# Patient Record
Sex: MALE | Race: BLACK OR AFRICAN AMERICAN | NOT HISPANIC OR LATINO | Employment: OTHER | URBAN - NONMETROPOLITAN AREA
[De-identification: names, ages, dates, MRNs, and addresses within clinical notes are randomized per-mention and may not be internally consistent; named-entity substitution may affect disease eponyms.]

---

## 2019-11-14 ENCOUNTER — OFFICE VISIT (OUTPATIENT)
Dept: GASTROENTEROLOGY | Facility: CLINIC | Age: 64
End: 2019-11-14

## 2019-11-14 VITALS
SYSTOLIC BLOOD PRESSURE: 130 MMHG | BODY MASS INDEX: 45.31 KG/M2 | HEIGHT: 68 IN | HEART RATE: 66 BPM | OXYGEN SATURATION: 97 % | DIASTOLIC BLOOD PRESSURE: 80 MMHG | WEIGHT: 299 LBS

## 2019-11-14 DIAGNOSIS — Z78.9 NONSMOKER: ICD-10-CM

## 2019-11-14 DIAGNOSIS — E66.01 CLASS 3 SEVERE OBESITY DUE TO EXCESS CALORIES WITHOUT SERIOUS COMORBIDITY WITH BODY MASS INDEX (BMI) OF 45.0 TO 49.9 IN ADULT (HCC): ICD-10-CM

## 2019-11-14 DIAGNOSIS — D12.6 ADENOMATOUS POLYP OF COLON, UNSPECIFIED PART OF COLON: Primary | ICD-10-CM

## 2019-11-14 DIAGNOSIS — G47.30 SLEEP APNEA, UNSPECIFIED TYPE: ICD-10-CM

## 2019-11-14 PROBLEM — E66.813 CLASS 3 SEVERE OBESITY DUE TO EXCESS CALORIES WITHOUT SERIOUS COMORBIDITY WITH BODY MASS INDEX (BMI) OF 45.0 TO 49.9 IN ADULT: Status: ACTIVE | Noted: 2019-11-14

## 2019-11-14 PROCEDURE — S0260 H&P FOR SURGERY: HCPCS | Performed by: NURSE PRACTITIONER

## 2019-11-14 RX ORDER — ALLOPURINOL 100 MG/1
TABLET ORAL DAILY
COMMUNITY
Start: 2013-11-06

## 2019-11-14 RX ORDER — METOPROLOL SUCCINATE 100 MG/1
TABLET, EXTENDED RELEASE ORAL DAILY
COMMUNITY
Start: 2019-09-10

## 2019-11-14 RX ORDER — AMLODIPINE BESYLATE 10 MG/1
10 TABLET ORAL DAILY
Refills: 3 | COMMUNITY
Start: 2019-10-19

## 2019-11-14 RX ORDER — CHLORTHALIDONE 25 MG/1
TABLET ORAL DAILY
COMMUNITY

## 2019-11-14 RX ORDER — LOSARTAN POTASSIUM 25 MG/1
25 TABLET ORAL DAILY
COMMUNITY

## 2019-11-14 NOTE — PROGRESS NOTES
Devin Cadena  1955      11/14/2019  Chief Complaint   Patient presents with   • Colonoscopy     Subjective   HPI  Devin Cadena is a 64 y.o. male who presents as a referral for preventative maintenance. He has no complaints of nausea or vomiting. No change in bowels. No wt loss. No BRBPR. No melena. There is no family hx for colon cancer. No abdominal pain.  Past Medical History:   Diagnosis Date   • Hx of colonic polyp    • Hypertension      Past Surgical History:   Procedure Laterality Date   • COLONOSCOPY W/ POLYPECTOMY  11/11/2011    2 early tubular adenomas repeat exam in 5 years   • COLONOSCOPY W/ POLYPECTOMY  12/19/2016    4 Tubular adenomas cecum and at 50 cm repeat exam in 3 years     Outpatient Medications Marked as Taking for the 11/14/19 encounter (Office Visit) with Mikaela Espana APRN   Medication Sig Dispense Refill   • allopurinol (ZYLOPRIM) 100 MG tablet Daily.     • amLODIPine (NORVASC) 10 MG tablet Take 10 mg by mouth Daily.  3   • chlorthalidone (HYGROTON) 25 MG tablet Daily.     • losartan (COZAAR) 25 MG tablet Take 25 mg by mouth Daily.     • metoprolol succinate XL (TOPROL-XL) 100 MG 24 hr tablet Daily.     • [DISCONTINUED] metoprolol tartrate (LOPRESSOR) 50 MG tablet Take 50 mg by mouth 2 (Two) Times a Day.       No Known Allergies  Social History     Socioeconomic History   • Marital status:      Spouse name: Not on file   • Number of children: Not on file   • Years of education: Not on file   • Highest education level: Not on file   Tobacco Use   • Smoking status: Never Smoker   • Smokeless tobacco: Never Used   Substance and Sexual Activity   • Alcohol use: No     Family History   Problem Relation Age of Onset   • Colon cancer Neg Hx    • Colon polyps Neg Hx      Health Maintenance   Topic Date Due   • ANNUAL PHYSICAL  03/16/1956   • TDAP/TD VACCINES (1 - Tdap) 03/16/1972   • ZOSTER VACCINE (1 of 2) 03/16/2003   • PNEUMOCOCCAL VACCINES (65+ LOW/MEDIUM RISK) (1 of 2 -  "PCV13) 03/16/2018   • INFLUENZA VACCINE  08/01/2019   • HEPATITIS C SCREENING  09/24/2019   • COLONOSCOPY  12/19/2026       REVIEW OF SYSTEMS  General: well appearing, no fever chills or sweats, no unexplained wt loss  HEENT: no acute visual or hearing disturbances  Cardiovascular: No chest pain or palpitations  Pulmonary: No shortness of breath, coughing, wheezing or hemoptysis  : No burning, urgency, hematuria, or dysuria  Musculoskeletal: No joint pain or stiffness  Peripheral: no edema  Skin: No lesions or rashes  Neuro: No dizziness, headaches, stroke, syncope  Endocrine: No hot or cold intolerances  Hematological: No blood dyscrasias    Objective   Vitals:    11/14/19 1012   BP: 130/80   Pulse: 66   SpO2: 97%   Weight: 136 kg (299 lb)   Height: 172.7 cm (68\")     Body mass index is 45.46 kg/m².  Patient's Body mass index is 45.46 kg/m². BMI is above normal parameters. Recommendations include: nutrition counseling.      PHYSICAL EXAM  General: age appropriate well nourished well appearing, no acute distress  Head: normocephalic and atraumatic  Global assessment-supple  Neck-No JVD noted, no lymphadenopathy  Pulmonary-clear to auscultation bilaterally, normal respiratory effort  Cardiovascular-normal rate and rhythm, normal heart sounds, S1 and S2 noted  Abdomen-soft, non tender, non distended, normal bowel sounds all 4 quadrants, no hepatosplenomegaly noted  Extremities-No clubbing cyanosis or edema  Neuro-Non focal, converses appropriately, awake, alert, oriented    Assessment/Plan     Devin was seen today for colonoscopy.    Diagnoses and all orders for this visit:    Adenomatous polyp of colon, unspecified part of colon  Comments:  last colonscopy 12/20/2016 tubular adenoma x 3, of cecum also tubular adenoma at 50cm, and chronic inflammation of rectum  Orders:  -     Case Request; Standing  -     Implement Anesthesia Orders Day of Procedure; Standing  -     Obtain Informed Consent; Standing  -     Verify " bowel prep was successful; Standing  -     Case Request  -     polyethylene glycol (GoLYTELY) 236 g solution; Take as directed per office    Nonsmoker    Class 3 severe obesity due to excess calories without serious comorbidity with body mass index (BMI) of 45.0 to 49.9 in adult (CMS/formerly [facility])    Sleep apnea, unspecified type  Comments:  CPAP at night        COLONOSCOPY WITH ANESTHESIA (N/A)  Body mass index is 45.46 kg/m².  There are no Patient Instructions on file for this visit.  Patient instructions on prep prior to procedure provided to the patient.    All risks, benefits, alternatives, and indications of colonoscopy procedure have been discussed with the patient. Risks to include perforation of the colon requiring possible surgery or colostomy, risk of bleeding from biopsies or removal of colon tissue, possibility of missing a colon polyp or cancer, or adverse drug reaction.  Benefits to include the diagnosis and management of disease of the colon and rectum. Alternatives to include barium enema, radiographic evaluation, lab testing or no intervention. Pt verbalizes understanding and agrees.     Mikaela Espana, APRN  2019  10:38 AM      IF YOU SMOKE OR USE TOBACCO PLEASE READ THE FOLLOWIN minutes reading provided    Why is smoking bad for me?  Smoking increases the risk of heart disease, lung disease, vascular disease, stroke, and cancer.     If you smoke, STOP!    If you would like more information on quitting smoking, please visit the [employer] website: [email or web address]   This link will provide additional resources including the QUIT line and the Beat the Pack support groups.     For more information:    Quit Now [place]  -QUIT-NOW  [email or web address]/en-[email or web address]    Obesity, Adult  Obesity is the condition of having too much total body fat. Being overweight or obese means that your weight is greater than what is considered healthy for  your body size. Obesity is determined by a measurement called BMI. BMI is an estimate of body fat and is calculated from height and weight. For adults, a BMI of 30 or higher is considered obese.  Obesity can eventually lead to other health concerns and major illnesses, including:  · Stroke.  · Coronary artery disease (CAD).  · Type 2 diabetes.  · Some types of cancer, including cancers of the colon, breast, uterus, and gallbladder.  · Osteoarthritis.  · High blood pressure (hypertension).  · High cholesterol.  · Sleep apnea.  · Gallbladder stones.  · Infertility problems.  What are the causes?  The main cause of obesity is taking in (consuming) more calories than your body uses for energy. Other factors that contribute to this condition may include:  · Being born with genes that make you more likely to become obese.  · Having a medical condition that causes obesity. These conditions include:  ¨ Hypothyroidism.  ¨ Polycystic ovarian syndrome (PCOS).  ¨ Binge-eating disorder.  ¨ Cushing syndrome.  · Taking certain medicines, such as steroids, antidepressants, and seizure medicines.  · Not being physically active (sedentary lifestyle).  · Living where there are limited places to exercise safely or buy healthy foods.  · Not getting enough sleep.  What increases the risk?  The following factors may increase your risk of this condition:  · Having a family history of obesity.  · Being a woman of -American descent.  · Being a man of  descent.  What are the signs or symptoms?  Having excessive body fat is the main symptom of this condition.  How is this diagnosed?  This condition may be diagnosed based on:  · Your symptoms.  · Your medical history.  · A physical exam. Your health care provider may measure:  ¨ Your BMI. If you are an adult with a BMI between 25 and less than 30, you are considered overweight. If you are an adult with a BMI of 30 or higher, you are considered obese.  ¨ The distances around your  hips and your waist (circumferences). These may be compared to each other to help diagnose your condition.  ¨ Your skinfold thickness. Your health care provider may gently pinch a fold of your skin and measure it.  How is this treated?  Treatment for this condition often includes changing your lifestyle. Treatment may include some or all of the following:  · Dietary changes. Work with your health care provider and a dietitian to set a weight-loss goal that is healthy and reasonable for you. Dietary changes may include eating:  ¨ Smaller portions. A portion size is the amount of a particular food that is healthy for you to eat at one time. This varies from person to person.  ¨ Low-calorie or low-fat options.  ¨ More whole grains, fruits, and vegetables.  · Regular physical activity. This may include aerobic activity (cardio) and strength training.  · Medicine to help you lose weight. Your health care provider may prescribe medicine if you are unable to lose 1 pound a week after 6 weeks of eating more healthily and doing more physical activity.  · Surgery. Surgical options may include gastric banding and gastric bypass. Surgery may be done if:  ¨ Other treatments have not helped to improve your condition.  ¨ You have a BMI of 40 or higher.  ¨ You have life-threatening health problems related to obesity.  Follow these instructions at home:     Eating and drinking     · Follow recommendations from your health care provider about what you eat and drink. Your health care provider may advise you to:  ¨ Limit fast foods, sweets, and processed snack foods.  ¨ Choose low-fat options, such as low-fat milk instead of whole milk.  ¨ Eat 5 or more servings of fruits or vegetables every day.  ¨ Eat at home more often. This gives you more control over what you eat.  ¨ Choose healthy foods when you eat out.  ¨ Learn what a healthy portion size is.  ¨ Keep low-fat snacks on hand.  ¨ Avoid sugary drinks, such as soda, fruit juice,  iced tea sweetened with sugar, and flavored milk.  ¨ Eat a healthy breakfast.  · Drink enough water to keep your urine clear or pale yellow.  · Do not go without eating for long periods of time (do not fast) or follow a fad diet. Fasting and fad diets can be unhealthy and even dangerous.  Physical Activity   · Exercise regularly, as told by your health care provider. Ask your health care provider what types of exercise are safe for you and how often you should exercise.  · Warm up and stretch before being active.  · Cool down and stretch after being active.  · Rest between periods of activity.  Lifestyle   · Limit the time that you spend in front of your TV, computer, or video game system.  · Find ways to reward yourself that do not involve food.  · Limit alcohol intake to no more than 1 drink a day for nonpregnant women and 2 drinks a day for men. One drink equals 12 oz of beer, 5 oz of wine, or 1½ oz of hard liquor.  General instructions   · Keep a weight loss journal to keep track of the food you eat and how much you exercise you get.  · Take over-the-counter and prescription medicines only as told by your health care provider.  · Take vitamins and supplements only as told by your health care provider.  · Consider joining a support group. Your health care provider may be able to recommend a support group.  · Keep all follow-up visits as told by your health care provider. This is important.  Contact a health care provider if:  · You are unable to meet your weight loss goal after 6 weeks of dietary and lifestyle changes.  This information is not intended to replace advice given to you by your health care provider. Make sure you discuss any questions you have with your health care provider.  Document Released: 01/25/2006 Document Revised: 05/22/2017 Document Reviewed: 10/05/2016  [employer] Interactive Patient Education © 2017 [employer]

## 2020-03-02 ENCOUNTER — OUTSIDE FACILITY SERVICE (OUTPATIENT)
Dept: GASTROENTEROLOGY | Facility: CLINIC | Age: 65
End: 2020-03-02

## 2020-03-02 ENCOUNTER — LAB REQUISITION (OUTPATIENT)
Dept: LAB | Facility: HOSPITAL | Age: 65
End: 2020-03-02

## 2020-03-02 DIAGNOSIS — Z00.00 ENCOUNTER FOR GENERAL ADULT MEDICAL EXAMINATION WITHOUT ABNORMAL FINDINGS: ICD-10-CM

## 2020-03-02 PROCEDURE — 88305 TISSUE EXAM BY PATHOLOGIST: CPT | Performed by: INTERNAL MEDICINE

## 2020-03-02 PROCEDURE — 45385 COLONOSCOPY W/LESION REMOVAL: CPT | Performed by: INTERNAL MEDICINE

## 2020-03-03 LAB
CYTO UR: NORMAL
LAB AP CASE REPORT: NORMAL
LAB AP CLINICAL INFORMATION: NORMAL
PATH REPORT.FINAL DX SPEC: NORMAL
PATH REPORT.GROSS SPEC: NORMAL

## 2024-02-13 ENCOUNTER — TELEPHONE (OUTPATIENT)
Dept: UROLOGY | Facility: CLINIC | Age: 69
End: 2024-02-13
Payer: MEDICARE

## 2024-02-19 NOTE — PROGRESS NOTES
Subjective    Mr. Cadena is 68 y.o. male    Chief Complaint: Hydrocele    History of Present Illness  Patient is a 68-year-old gentleman who presents as a new patient to [facility] urology.  He was referred by his PCP from [facility].  In [facility].  Patient has a hydrocele and was also diagnosed with urinary tract infection.  The urinary tract infection has resolved patient denies any voiding symptoms or complaints at this time he was treated with Levaquin.  He did have a CT urogram that showed right kidney stone mild thickening of the wall of the right renal pelvis mild right hydro suspicious for infection.  January 2024 he had a PSA of 6.8 however this most likely was occurring in the setting of infection.  His complaint is his swollen scrotum he states he has had since the 80s and he just got slightly worse over the years it is nonpainful but is now become cumbersome and uncomfortable he wants surgical intervention.  Fortunately they did not bring the disc of the ultrasound with them.    The following portions of the patient's history were reviewed and updated as appRopriate: allergies, current medications, past family history, past medical history, past social history, past surgical history and problem list.    Review of Systems   Genitourinary:  Positive for scrotal swelling.         Current Outpatient Medications:     allopurinol (ZYLOPRIM) 100 MG tablet, Daily., Disp: , Rfl:     amLODIPine (NORVASC) 10 MG tablet, Take 10 mg by mouth Daily., Disp: , Rfl: 3    chlorthalidone (HYGROTON) 25 MG tablet, Daily., Disp: , Rfl:     losartan (COZAAR) 25 MG tablet, Take 25 mg by mouth Daily., Disp: , Rfl:     metoprolol succinate XL (TOPROL-XL) 100 MG 24 hr tablet, Daily., Disp: , Rfl:     polyethylene glycol (GoLYTELY) 236 g solution, Take as directed per office, Disp: 4000 mL, Rfl: 0    Past Medical History:   Diagnosis Date    Hx of colonic polyp     Hypertension        Past Surgical  History:   Procedure Laterality Date    COLONOSCOPY W/ POLYPECTOMY  11/11/2011    2 early tubular adenomas repeat exam in 5 years    COLONOSCOPY W/ POLYPECTOMY  12/19/2016    4 Tubular adenomas cecum and at 50 cm repeat exam in 3 years       Social History     Socioeconomic History    Marital status:    Tobacco Use    Smoking status: Never    Smokeless tobacco: Never   Substance and Sexual Activity    Alcohol use: No       Family History   Problem Relation Age of Onset    Colon cancer Neg Hx     Colon polyps Neg Hx        Objective    There were no vitals taken for this visit.    Physical Exam  Vitals reviewed.   Constitutional:       General: He is not in acute distress.     Appearance: Normal appearance. He is not toxic-appearing.   HENT:      Head: Normocephalic and atraumatic.   Pulmonary:      Effort: Pulmonary effort is normal.   Genitourinary:     Penis: Uncircumcised.       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  Skin:     Coloration: Skin is not pale.   Neurological:      Mental Status: He is alert.   Psychiatric:         Mood and Affect: Mood normal.         Behavior: Behavior normal.             Assessment and Plan    Diagnoses and all orders for this visit:    1. Hydrocele, unspecified hydrocele type (Primary)  -     POC Urinalysis Dipstick, Multipro    2. Elevated prostate specific antigen (PSA)  -     POC Urinalysis Dipstick, Multipro    Patient with large bilateral hydrocele that he did not bring the disc with them so was not able to view images so I will need to see those before making any decision on surgical intervention.  Told him I would contact her discussed with Dr. Acosta once I have reviewed the ultrasound he did have a CT urogram that shows no inguinal hernia.  I discussed hydrocelectomy and we just I discussed the risk factors such as  postoperative infection, postoperative hematoma or bleeding.  Postoperative swelling and pain.  Also stated that there are certain percentage of hydroceles recur after surgery.  Aware of this and wants to proceed with scheduling also he is on Xarelto so would have to stop his Xarelto for 3 to 5 days.  Again once I have reviewed the ultrasound I will contact Dr. Acosta regarding surgery.

## 2024-02-19 NOTE — H&P (VIEW-ONLY)
Subjective    Mr. Cadena is 68 y.o. male    Chief Complaint: Hydrocele    History of Present Illness  Patient is a 68-year-old gentleman who presents as a new patient to [facility] urology.  He was referred by his PCP from [facility].  In [facility].  Patient has a hydrocele and was also diagnosed with urinary tract infection.  The urinary tract infection has resolved patient denies any voiding symptoms or complaints at this time he was treated with Levaquin.  He did have a CT urogram that showed right kidney stone mild thickening of the wall of the right renal pelvis mild right hydro suspicious for infection.  January 2024 he had a PSA of 6.8 however this most likely was occurring in the setting of infection.  His complaint is his swollen scrotum he states he has had since the 80s and he just got slightly worse over the years it is nonpainful but is now become cumbersome and uncomfortable he wants surgical intervention.  Fortunately they did not bring the disc of the ultrasound with them.    The following portions of the patient's history were reviewed and updated as appRopriate: allergies, current medications, past family history, past medical history, past social history, past surgical history and problem list.    Review of Systems   Genitourinary:  Positive for scrotal swelling.         Current Outpatient Medications:     allopurinol (ZYLOPRIM) 100 MG tablet, Daily., Disp: , Rfl:     amLODIPine (NORVASC) 10 MG tablet, Take 10 mg by mouth Daily., Disp: , Rfl: 3    chlorthalidone (HYGROTON) 25 MG tablet, Daily., Disp: , Rfl:     losartan (COZAAR) 25 MG tablet, Take 25 mg by mouth Daily., Disp: , Rfl:     metoprolol succinate XL (TOPROL-XL) 100 MG 24 hr tablet, Daily., Disp: , Rfl:     polyethylene glycol (GoLYTELY) 236 g solution, Take as directed per office, Disp: 4000 mL, Rfl: 0    Past Medical History:   Diagnosis Date    Hx of colonic polyp     Hypertension        Past Surgical  History:   Procedure Laterality Date    COLONOSCOPY W/ POLYPECTOMY  11/11/2011    2 early tubular adenomas repeat exam in 5 years    COLONOSCOPY W/ POLYPECTOMY  12/19/2016    4 Tubular adenomas cecum and at 50 cm repeat exam in 3 years       Social History     Socioeconomic History    Marital status:    Tobacco Use    Smoking status: Never    Smokeless tobacco: Never   Substance and Sexual Activity    Alcohol use: No       Family History   Problem Relation Age of Onset    Colon cancer Neg Hx     Colon polyps Neg Hx        Objective    There were no vitals taken for this visit.    Physical Exam  Vitals reviewed.   Constitutional:       General: He is not in acute distress.     Appearance: Normal appearance. He is not toxic-appearing.   HENT:      Head: Normocephalic and atraumatic.   Pulmonary:      Effort: Pulmonary effort is normal.   Genitourinary:     Penis: Uncircumcised.       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  Skin:     Coloration: Skin is not pale.   Neurological:      Mental Status: He is alert.   Psychiatric:         Mood and Affect: Mood normal.         Behavior: Behavior normal.             Assessment and Plan    Diagnoses and all orders for this visit:    1. Hydrocele, unspecified hydrocele type (Primary)  -     POC Urinalysis Dipstick, Multipro    2. Elevated prostate specific antigen (PSA)  -     POC Urinalysis Dipstick, Multipro    Patient with large bilateral hydrocele that he did not bring the disc with them so was not able to view images so I will need to see those before making any decision on surgical intervention.  Told him I would contact her discussed with Dr. Acosta once I have reviewed the ultrasound he did have a CT urogram that shows no inguinal hernia.  I discussed hydrocelectomy and we just I discussed the risk factors such as  postoperative infection, postoperative hematoma or bleeding.  Postoperative swelling and pain.  Also stated that there are certain percentage of hydroceles recur after surgery.  Aware of this and wants to proceed with scheduling also he is on Xarelto so would have to stop his Xarelto for 3 to 5 days.  Again once I have reviewed the ultrasound I will contact Dr. Acosta regarding surgery.

## 2024-03-01 ENCOUNTER — PATIENT ROUNDING (BHMG ONLY) (OUTPATIENT)
Dept: UROLOGY | Facility: CLINIC | Age: 69
End: 2024-03-01
Payer: MEDICARE

## 2024-03-01 ENCOUNTER — OFFICE VISIT (OUTPATIENT)
Dept: UROLOGY | Facility: CLINIC | Age: 69
End: 2024-03-01
Payer: MEDICARE

## 2024-03-01 VITALS — HEIGHT: 69 IN | WEIGHT: 280 LBS | BODY MASS INDEX: 41.47 KG/M2

## 2024-03-01 DIAGNOSIS — R97.20 ELEVATED PROSTATE SPECIFIC ANTIGEN (PSA): ICD-10-CM

## 2024-03-01 DIAGNOSIS — N43.3 HYDROCELE, UNSPECIFIED HYDROCELE TYPE: Primary | ICD-10-CM

## 2024-03-01 RX ORDER — UBIDECARENONE 100 MG
CAPSULE ORAL
COMMUNITY

## 2024-03-01 RX ORDER — FLUTICASONE PROPIONATE 0.05 %
CREAM (GRAM) TOPICAL
COMMUNITY

## 2024-03-01 RX ORDER — RIVAROXABAN 20 MG/1
20 TABLET, FILM COATED ORAL DAILY
COMMUNITY

## 2024-03-01 RX ORDER — CETIRIZINE HYDROCHLORIDE 10 MG/1
1 TABLET ORAL DAILY
COMMUNITY

## 2024-03-01 RX ORDER — ROSUVASTATIN CALCIUM 20 MG/1
1 TABLET, COATED ORAL DAILY
COMMUNITY

## 2024-03-01 RX ORDER — METFORMIN HYDROCHLORIDE 500 MG/1
1 TABLET, EXTENDED RELEASE ORAL DAILY
COMMUNITY
Start: 2024-02-07

## 2024-03-01 NOTE — PROGRESS NOTES
March 1, 2024    Hello, may I speak with Devin Cadena?    My name is Tiana    I am  with [facility]  [address], [address]  [place] [address] [phone].    Before we get started may I verify your date of birth? 1955    I am calling to officially welcome you to our practice and ask about your recent visit. Is this a good time to talk? yes    Tell me about your visit with us. What things went well?  It was a good visit.       We're always looking for ways to make our patients' experiences even better. Do you have recommendations on ways we may improve?  no    Overall were you satisfied with your first visit to our practice? yes       I appreciate you taking the time to speak with me today. Is there anything else I can do for you? no      Thank you, and have a great day.

## 2024-03-06 ENCOUNTER — TELEPHONE (OUTPATIENT)
Dept: UROLOGY | Facility: CLINIC | Age: 69
End: 2024-03-06
Payer: MEDICARE

## 2024-03-08 ENCOUNTER — TELEPHONE (OUTPATIENT)
Dept: UROLOGY | Facility: CLINIC | Age: 69
End: 2024-03-08
Payer: MEDICARE

## 2024-03-08 PROBLEM — N43.3 HYDROCELE: Status: ACTIVE | Noted: 2024-03-01

## 2024-03-08 NOTE — TELEPHONE ENCOUNTER
I called patient and spoke with him and let him know that I was able to review the scrotal ultrasound after he brought the disc into the clinic.  I also told him I discussed his case with Dr. Acosta who said to go ahead as planned with surgery if he wants to do and he does.  I did tell Mr. Cadena that he may be walking bowlegged for about 2 weeks after the surgery due to discomfort and swelling.  Also told him he would have a Penrose drain coming from the scrotum for approximately 3 to 5 days after surgery.  Also he needs to stop his Xarelto for 5 days prior and he can take baby aspirin instead.  I let him know that he would be contacted by Dr. Acosta's medical assistant to get the surgery scheduled.  Patient had no further questions at this time.

## 2024-03-08 NOTE — TELEPHONE ENCOUNTER
----- Message from LORENA Disla sent at 3/8/2024 12:42 PM CST -----  Regarding: RE: Hydroceles  See Dr. Howell's note below but I put in the case request for this patient I have already spoken to him on the phone earlier and explained that we would be scheduling him for hydrocelectomy.  I told him to hold his Xarelto for 5 days  ----- Message -----  From: Daniel Acosta MD  Sent: 3/7/2024  12:09 PM CST  To: LORENA Disla; Balbina Poole CMA  Subject: RE: Hydroceles                                   Yes, if he is wanting surgery, you can schedule him for bilateral hydrocelectomy for 3/25, 3/26, or 3/29 or anytime in April.    He needs to hold the Xarelto 5 days prior and take a a baby aspirin instead.      Please make sure he understands he will have bilateral scrotal penrose drains for 3-5 days after, and will be walking now-legged for at least 2 weeks.     Thank you  ----- Message -----  From: Jamey Theodore PA  Sent: 3/7/2024  11:03 AM CST  To: Daniel Acosta MD  Subject: Hydroceles                                       Patient I saw with bilateral hydrocele.  He had a CT scan done in January that revealed no inguinal hernias and ultrasound revealed bilateral hydroceles he had brought in the disc of the ultrasound which I reviewed yesterday and does verify bilateral hydroceles also physical exam when I saw him was consistent also with hydroceles bilaterally.  He is on Xarelto which I told him he need to stop 3 to 5 days prior however he is wanting to have surgical intervention I did discuss risks of the surgery including recurrence hematoma excetra.  Was just checking with you regarding scheduling or would you want to see the patient?

## 2024-03-18 ENCOUNTER — PRE-ADMISSION TESTING (OUTPATIENT)
Dept: PREADMISSION TESTING | Facility: HOSPITAL | Age: 69
End: 2024-03-18
Payer: MEDICARE

## 2024-03-18 LAB
ANION GAP SERPL CALCULATED.3IONS-SCNC: 11 MMOL/L (ref 5–15)
BUN SERPL-MCNC: 16 MG/DL (ref 8–23)
BUN/CREAT SERPL: 19 (ref 7–25)
CALCIUM SPEC-SCNC: 9.2 MG/DL (ref 8.6–10.5)
CHLORIDE SERPL-SCNC: 105 MMOL/L (ref 98–107)
CO2 SERPL-SCNC: 26 MMOL/L (ref 22–29)
CREAT SERPL-MCNC: 0.84 MG/DL (ref 0.76–1.27)
DEPRECATED RDW RBC AUTO: 47.9 FL (ref 37–54)
EGFRCR SERPLBLD CKD-EPI 2021: 93.2 ML/MIN/1.73
ERYTHROCYTE [DISTWIDTH] IN BLOOD BY AUTOMATED COUNT: 13.9 % (ref 12.3–15.4)
GLUCOSE SERPL-MCNC: 142 MG/DL (ref 65–99)
HCT VFR BLD AUTO: 43.7 % (ref 37.5–51)
HGB BLD-MCNC: 14.5 G/DL (ref 13–17.7)
MCH RBC QN AUTO: 31.3 PG (ref 26.6–33)
MCHC RBC AUTO-ENTMCNC: 33.2 G/DL (ref 31.5–35.7)
MCV RBC AUTO: 94.2 FL (ref 79–97)
PLATELET # BLD AUTO: 269 10*3/MM3 (ref 140–450)
PMV BLD AUTO: 10.2 FL (ref 6–12)
POTASSIUM SERPL-SCNC: 3.4 MMOL/L (ref 3.5–5.2)
RBC # BLD AUTO: 4.64 10*6/MM3 (ref 4.14–5.8)
SODIUM SERPL-SCNC: 142 MMOL/L (ref 136–145)
WBC NRBC COR # BLD AUTO: 8.75 10*3/MM3 (ref 3.4–10.8)

## 2024-03-18 PROCEDURE — 93005 ELECTROCARDIOGRAM TRACING: CPT

## 2024-03-18 PROCEDURE — 80048 BASIC METABOLIC PNL TOTAL CA: CPT

## 2024-03-18 PROCEDURE — 36415 COLL VENOUS BLD VENIPUNCTURE: CPT

## 2024-03-18 PROCEDURE — 85027 COMPLETE CBC AUTOMATED: CPT

## 2024-03-18 RX ORDER — MULTIPLE VITAMINS W/ MINERALS TAB 9MG-400MCG
1 TAB ORAL DAILY
COMMUNITY

## 2024-03-18 RX ORDER — SACCHAROMYCES BOULARDII 250 MG
250 CAPSULE ORAL DAILY
COMMUNITY

## 2024-03-18 NOTE — DISCHARGE INSTRUCTIONS
Preparing for Surgery  Follow these instructions before the procedure:  Several days or weeks before your procedure  Medication(s) you need to stop   _______ days/week prior to surgery STOP XARELTO as directed by MD      Ask your health care provider about:  Changing or stopping your regular medicines. This is especially important if you are taking diabetes medicines or blood thinners.  Taking medicines such as aspirin and non-steroidal anti-inflammatory drugs (NSAIDS) such as ibuprofen that can thin your blood. Do not take these medicines unless your health care provider tells you to take them.  Taking over-the-counter medicines, vitamins, herbs, and supplements.  Contact your surgeon if you:  Develop a fever of more than 100.4°F (38°C) or other feelings of illness during the 48 hours before your surgery.  Have symptoms that get worse.  Have questions or concerns about your surgery.  If you are going home the same day of your surgery you will need to arrange for a responsible adult, age 18 years old or older, to drive you home from the hospital and stay with you for 24 hours. Verification of the  will be made prior to any procedure requiring sedation. You may not go home in a taxi or any form of public transportation by yourself.     Day before your procedure  Medication(s) you need to stop the day before your surgery:  Losartan (COZAAR)    24 hours before your procedure DO NOT drink alcoholic beverages or smoke.  24 hours before your procedure STOP taking Erectile Dysfunction medication (i.e.,Cialis, Viagra)   You may be asked to shower with a germ-killing soap.  Day of your procedure   You may take the following medication(s) the morning of surgery with a sip of water:  METOPROLOL (Toprol XL)      8 hours before your procedure STOP all food, any dairy products, and full liquids. This includes hard candy, chewing gum or mints. This is extremely important to prevent serious complications.   Up to 2 hours  before your scheduled arrival time, you may have clear liquids no cream, powder, or pulp of any kind. Safe options are water, black coffee, plain tea, soda, Gatorade/Powerade, clear broth, apple juice.  2 hours before your scheduled arrival time, STOP drinking clear liquids.  You may need to take another shower with a germ-killing soap before you leave home in the morning. Do not use perfumes, colognes, or body lotions.  Wear comfortable loose-fitting clothing.  Remove all jewelry including body piercing and rings, dark colored nail polish, and make up prior to arrival at the hospital. Leave all valuables at home.   Bring your hearing aids if you rely on them.  Do not wear contact lenses. If you wear eyeglasses remember to bring a case to store them in while you are in surgery.  Do not use denture adhesives since you will be asked to remove them during your surgery.    You do not need to bring your home medications into the hospital.   Bring your sleep apnea device with you on the day of your surgery (if this applies to you).  If you wear portable oxygen, bring it with you.   If you are staying overnight, you may bring a bag of items you may need such as slippers, robe and a change of clothes for your discharge. You may want to leave these items in the car until you are ready for them since your family will take your belongings when you leave the pre-operative area.  Arrive at the hospital as scheduled by the office. You will be asked to arrive 2 hours prior to your surgery time in order to prepare for your procedure.  When you arrive at the hospital  Go to the registration desk located at the main entrance of the hospital.  After registration is completed, you will be given a beeper and a sticker sheet. Take the stickers to Outpatient Surgery and place in the tray at the end of the desk to notify the staff that you have arrived and registered.   Return to the lobby to wait. You are not always called back according  to the time of arrival but rather the time your doctor will be ready.  When your beeper lights up and vibrates proceed through the double doors, under the stairs, and a member of the Outpatient Surgery staff will escort you to your preoperative room.

## 2024-03-20 ENCOUNTER — TELEPHONE (OUTPATIENT)
Dept: UROLOGY | Facility: CLINIC | Age: 69
End: 2024-03-20
Payer: MEDICARE

## 2024-03-20 LAB
QT INTERVAL: 464 MS
QTC INTERVAL: 470 MS

## 2024-03-20 NOTE — TELEPHONE ENCOUNTER
Called patient to remind them to arrive at patient registration on 3-25-24 at 11am for the procedure with Dr. Acosta. Spoke with patient. Told patient if they had any questions to please contact our office at [phone].

## 2024-03-25 ENCOUNTER — ANESTHESIA EVENT (OUTPATIENT)
Dept: PERIOP | Facility: HOSPITAL | Age: 69
End: 2024-03-25
Payer: MEDICARE

## 2024-03-25 ENCOUNTER — HOSPITAL ENCOUNTER (OUTPATIENT)
Facility: HOSPITAL | Age: 69
Setting detail: HOSPITAL OUTPATIENT SURGERY
Discharge: HOME | End: 2024-03-25
Attending: UROLOGY | Admitting: UROLOGY
Payer: MEDICARE

## 2024-03-25 ENCOUNTER — ANESTHESIA (OUTPATIENT)
Dept: PERIOP | Facility: HOSPITAL | Age: 69
End: 2024-03-25
Payer: MEDICARE

## 2024-03-25 VITALS
TEMPERATURE: 97.5 F | HEART RATE: 58 BPM | RESPIRATION RATE: 16 BRPM | SYSTOLIC BLOOD PRESSURE: 164 MMHG | DIASTOLIC BLOOD PRESSURE: 85 MMHG | WEIGHT: 284.39 LBS | BODY MASS INDEX: 43.1 KG/M2 | OXYGEN SATURATION: 93 % | HEIGHT: 68 IN

## 2024-03-25 DIAGNOSIS — N43.3 HYDROCELE, UNSPECIFIED HYDROCELE TYPE: ICD-10-CM

## 2024-03-25 DIAGNOSIS — N43.2 OTHER HYDROCELE: Primary | ICD-10-CM

## 2024-03-25 LAB
GLUCOSE BLDC GLUCOMTR-MCNC: 127 MG/DL (ref 70–130)
GLUCOSE BLDC GLUCOMTR-MCNC: 136 MG/DL (ref 70–130)

## 2024-03-25 PROCEDURE — 55040 REMOVAL OF HYDROCELE: CPT | Performed by: UROLOGY

## 2024-03-25 PROCEDURE — 25010000002 CLINDAMYCIN 900 MG/50ML SOLUTION: Performed by: PHYSICIAN ASSISTANT

## 2024-03-25 PROCEDURE — 25010000002 ONDANSETRON PER 1 MG

## 2024-03-25 PROCEDURE — 54840 REMOVE EPIDIDYMIS LESION: CPT | Performed by: UROLOGY

## 2024-03-25 PROCEDURE — 88302 TISSUE EXAM BY PATHOLOGIST: CPT | Performed by: UROLOGY

## 2024-03-25 PROCEDURE — 25010000002 DEXAMETHASONE PER 1 MG

## 2024-03-25 PROCEDURE — 25010000002 PROPOFOL 10 MG/ML EMULSION

## 2024-03-25 PROCEDURE — 25010000002 SUGAMMADEX 200 MG/2ML SOLUTION

## 2024-03-25 PROCEDURE — 25810000003 LACTATED RINGERS PER 1000 ML: Performed by: UROLOGY

## 2024-03-25 PROCEDURE — 25010000002 BUPIVACAINE 0.5 % SOLUTION: Performed by: UROLOGY

## 2024-03-25 PROCEDURE — 25810000003 SODIUM CHLORIDE PER 500 ML: Performed by: UROLOGY

## 2024-03-25 PROCEDURE — 25010000002 FENTANYL CITRATE (PF) 100 MCG/2ML SOLUTION

## 2024-03-25 PROCEDURE — 82948 REAGENT STRIP/BLOOD GLUCOSE: CPT

## 2024-03-25 RX ORDER — SODIUM CHLORIDE 0.9 % (FLUSH) 0.9 %
3 SYRINGE (ML) INJECTION EVERY 12 HOURS SCHEDULED
Status: DISCONTINUED | OUTPATIENT
Start: 2024-03-25 | End: 2024-03-25 | Stop reason: HOSPADM

## 2024-03-25 RX ORDER — CLINDAMYCIN PHOSPHATE 900 MG/50ML
900 INJECTION, SOLUTION INTRAVENOUS ONCE
Status: COMPLETED | OUTPATIENT
Start: 2024-03-25 | End: 2024-03-25

## 2024-03-25 RX ORDER — BACITRACIN ZINC 500 [USP'U]/G
OINTMENT TOPICAL AS NEEDED
Status: DISCONTINUED | OUTPATIENT
Start: 2024-03-25 | End: 2024-03-25 | Stop reason: HOSPADM

## 2024-03-25 RX ORDER — LIDOCAINE HYDROCHLORIDE 20 MG/ML
INJECTION, SOLUTION EPIDURAL; INFILTRATION; INTRACAUDAL; PERINEURAL AS NEEDED
Status: DISCONTINUED | OUTPATIENT
Start: 2024-03-25 | End: 2024-03-25 | Stop reason: SURG

## 2024-03-25 RX ORDER — SODIUM CHLORIDE 9 MG/ML
INJECTION, SOLUTION INTRAVENOUS AS NEEDED
Status: DISCONTINUED | OUTPATIENT
Start: 2024-03-25 | End: 2024-03-25 | Stop reason: HOSPADM

## 2024-03-25 RX ORDER — SODIUM CHLORIDE 9 MG/ML
40 INJECTION, SOLUTION INTRAVENOUS AS NEEDED
Status: DISCONTINUED | OUTPATIENT
Start: 2024-03-25 | End: 2024-03-25 | Stop reason: HOSPADM

## 2024-03-25 RX ORDER — HYDROCODONE BITARTRATE AND ACETAMINOPHEN 5; 325 MG/1; MG/1
1 TABLET ORAL EVERY 4 HOURS PRN
Status: DISCONTINUED | OUTPATIENT
Start: 2024-03-25 | End: 2024-03-25 | Stop reason: HOSPADM

## 2024-03-25 RX ORDER — DROPERIDOL 2.5 MG/ML
0.62 INJECTION, SOLUTION INTRAMUSCULAR; INTRAVENOUS ONCE AS NEEDED
Status: DISCONTINUED | OUTPATIENT
Start: 2024-03-25 | End: 2024-03-25 | Stop reason: HOSPADM

## 2024-03-25 RX ORDER — BUPIVACAINE HYDROCHLORIDE 5 MG/ML
INJECTION, SOLUTION PERINEURAL AS NEEDED
Status: DISCONTINUED | OUTPATIENT
Start: 2024-03-25 | End: 2024-03-25 | Stop reason: HOSPADM

## 2024-03-25 RX ORDER — FENTANYL CITRATE 50 UG/ML
INJECTION, SOLUTION INTRAMUSCULAR; INTRAVENOUS AS NEEDED
Status: DISCONTINUED | OUTPATIENT
Start: 2024-03-25 | End: 2024-03-25 | Stop reason: SURG

## 2024-03-25 RX ORDER — PROPOFOL 10 MG/ML
VIAL (ML) INTRAVENOUS AS NEEDED
Status: DISCONTINUED | OUTPATIENT
Start: 2024-03-25 | End: 2024-03-25 | Stop reason: SURG

## 2024-03-25 RX ORDER — ONDANSETRON 4 MG/1
4 TABLET, ORALLY DISINTEGRATING ORAL EVERY 6 HOURS PRN
Qty: 6 TABLET | Refills: 1 | Status: SHIPPED | OUTPATIENT
Start: 2024-03-25

## 2024-03-25 RX ORDER — FLUMAZENIL 0.1 MG/ML
0.2 INJECTION INTRAVENOUS AS NEEDED
Status: DISCONTINUED | OUTPATIENT
Start: 2024-03-25 | End: 2024-03-25 | Stop reason: HOSPADM

## 2024-03-25 RX ORDER — DEXAMETHASONE SODIUM PHOSPHATE 4 MG/ML
INJECTION, SOLUTION INTRA-ARTICULAR; INTRALESIONAL; INTRAMUSCULAR; INTRAVENOUS; SOFT TISSUE AS NEEDED
Status: DISCONTINUED | OUTPATIENT
Start: 2024-03-25 | End: 2024-03-25 | Stop reason: SURG

## 2024-03-25 RX ORDER — SODIUM CHLORIDE 0.9 % (FLUSH) 0.9 %
3 SYRINGE (ML) INJECTION AS NEEDED
Status: DISCONTINUED | OUTPATIENT
Start: 2024-03-25 | End: 2024-03-25 | Stop reason: HOSPADM

## 2024-03-25 RX ORDER — NALOXONE HCL 0.4 MG/ML
0.4 VIAL (ML) INJECTION AS NEEDED
Status: DISCONTINUED | OUTPATIENT
Start: 2024-03-25 | End: 2024-03-25 | Stop reason: HOSPADM

## 2024-03-25 RX ORDER — HYDROCODONE BITARTRATE AND ACETAMINOPHEN 10; 325 MG/1; MG/1
1 TABLET ORAL EVERY 4 HOURS PRN
Status: DISCONTINUED | OUTPATIENT
Start: 2024-03-25 | End: 2024-03-25 | Stop reason: HOSPADM

## 2024-03-25 RX ORDER — ONDANSETRON 2 MG/ML
INJECTION INTRAMUSCULAR; INTRAVENOUS AS NEEDED
Status: DISCONTINUED | OUTPATIENT
Start: 2024-03-25 | End: 2024-03-25 | Stop reason: SURG

## 2024-03-25 RX ORDER — ROCURONIUM BROMIDE 10 MG/ML
INJECTION, SOLUTION INTRAVENOUS AS NEEDED
Status: DISCONTINUED | OUTPATIENT
Start: 2024-03-25 | End: 2024-03-25 | Stop reason: SURG

## 2024-03-25 RX ORDER — LIDOCAINE HYDROCHLORIDE 10 MG/ML
0.5 INJECTION, SOLUTION EPIDURAL; INFILTRATION; INTRACAUDAL; PERINEURAL ONCE AS NEEDED
Status: DISCONTINUED | OUTPATIENT
Start: 2024-03-25 | End: 2024-03-25 | Stop reason: HOSPADM

## 2024-03-25 RX ORDER — SULFAMETHOXAZOLE AND TRIMETHOPRIM 800; 160 MG/1; MG/1
1 TABLET ORAL 2 TIMES DAILY
Qty: 14 TABLET | Refills: 0 | Status: SHIPPED | OUTPATIENT
Start: 2024-03-25 | End: 2024-04-01

## 2024-03-25 RX ORDER — SODIUM CHLORIDE, SODIUM LACTATE, POTASSIUM CHLORIDE, CALCIUM CHLORIDE 600; 310; 30; 20 MG/100ML; MG/100ML; MG/100ML; MG/100ML
1000 INJECTION, SOLUTION INTRAVENOUS CONTINUOUS
Status: DISCONTINUED | OUTPATIENT
Start: 2024-03-25 | End: 2024-03-25 | Stop reason: HOSPADM

## 2024-03-25 RX ORDER — ONDANSETRON 2 MG/ML
4 INJECTION INTRAMUSCULAR; INTRAVENOUS ONCE AS NEEDED
Status: DISCONTINUED | OUTPATIENT
Start: 2024-03-25 | End: 2024-03-25 | Stop reason: HOSPADM

## 2024-03-25 RX ORDER — DOCUSATE SODIUM 100 MG/1
100 CAPSULE, LIQUID FILLED ORAL 2 TIMES DAILY
Qty: 60 CAPSULE | Refills: 1 | Status: SHIPPED | OUTPATIENT
Start: 2024-03-25

## 2024-03-25 RX ORDER — LABETALOL HYDROCHLORIDE 5 MG/ML
5 INJECTION, SOLUTION INTRAVENOUS
Status: DISCONTINUED | OUTPATIENT
Start: 2024-03-25 | End: 2024-03-25 | Stop reason: HOSPADM

## 2024-03-25 RX ORDER — IBUPROFEN 600 MG/1
600 TABLET ORAL EVERY 6 HOURS PRN
Status: DISCONTINUED | OUTPATIENT
Start: 2024-03-25 | End: 2024-03-25 | Stop reason: HOSPADM

## 2024-03-25 RX ORDER — NAPROXEN 250 MG/1
250 TABLET ORAL 2 TIMES DAILY WITH MEALS
Qty: 60 TABLET | Refills: 0 | Status: SHIPPED | OUTPATIENT
Start: 2024-03-25

## 2024-03-25 RX ORDER — HYDROCODONE BITARTRATE AND ACETAMINOPHEN 7.5; 325 MG/1; MG/1
1 TABLET ORAL EVERY 6 HOURS PRN
Qty: 18 TABLET | Refills: 0 | Status: SHIPPED | OUTPATIENT
Start: 2024-03-25 | End: 2024-03-28

## 2024-03-25 RX ORDER — FENTANYL CITRATE 50 UG/ML
50 INJECTION, SOLUTION INTRAMUSCULAR; INTRAVENOUS
Status: DISCONTINUED | OUTPATIENT
Start: 2024-03-25 | End: 2024-03-25 | Stop reason: HOSPADM

## 2024-03-25 RX ORDER — SODIUM CHLORIDE 0.9 % (FLUSH) 0.9 %
3-10 SYRINGE (ML) INJECTION AS NEEDED
Status: DISCONTINUED | OUTPATIENT
Start: 2024-03-25 | End: 2024-03-25 | Stop reason: HOSPADM

## 2024-03-25 RX ORDER — SODIUM CHLORIDE, SODIUM LACTATE, POTASSIUM CHLORIDE, CALCIUM CHLORIDE 600; 310; 30; 20 MG/100ML; MG/100ML; MG/100ML; MG/100ML
100 INJECTION, SOLUTION INTRAVENOUS CONTINUOUS
Status: DISCONTINUED | OUTPATIENT
Start: 2024-03-25 | End: 2024-03-25 | Stop reason: HOSPADM

## 2024-03-25 RX ADMIN — FENTANYL CITRATE 100 MCG: 50 INJECTION, SOLUTION INTRAMUSCULAR; INTRAVENOUS at 12:46

## 2024-03-25 RX ADMIN — ROCURONIUM BROMIDE 30 MG: 50 INJECTION INTRAVENOUS at 12:48

## 2024-03-25 RX ADMIN — PROPOFOL 150 MG: 10 INJECTION, EMULSION INTRAVENOUS at 12:48

## 2024-03-25 RX ADMIN — DEXAMETHASONE SODIUM PHOSPHATE 4 MG: 4 INJECTION, SOLUTION INTRA-ARTICULAR; INTRALESIONAL; INTRAMUSCULAR; INTRAVENOUS; SOFT TISSUE at 12:56

## 2024-03-25 RX ADMIN — ONDANSETRON 4 MG: 2 INJECTION INTRAMUSCULAR; INTRAVENOUS at 12:56

## 2024-03-25 RX ADMIN — SUGAMMADEX 200 MG: 100 INJECTION, SOLUTION INTRAVENOUS at 14:04

## 2024-03-25 RX ADMIN — SODIUM CHLORIDE, POTASSIUM CHLORIDE, SODIUM LACTATE AND CALCIUM CHLORIDE 1000 ML: 600; 310; 30; 20 INJECTION, SOLUTION INTRAVENOUS at 11:54

## 2024-03-25 RX ADMIN — LIDOCAINE HYDROCHLORIDE 80 MG: 20 INJECTION, SOLUTION EPIDURAL; INFILTRATION; INTRACAUDAL; PERINEURAL at 12:48

## 2024-03-25 RX ADMIN — CLINDAMYCIN PHOSPHATE 900 MG: 900 INJECTION, SOLUTION INTRAVENOUS at 12:53

## 2024-03-25 NOTE — INTERVAL H&P NOTE
H&P updated. The patient was examined and the following changes are noted:    Patient has bilateral bothersome left greater than right hydroceles for which he requests repair.  We discussed options for observation, needle aspiration, staged repair.  He would like to proceed with concomitant bilateral hydrocele repair today.  We discussed risks of the procedure including but not limited to infection, bleeding, need for additional procedures, need for postoperative scrotal Penrose drain placement, postoperative hematoma, chronic pain, recurrence, complications of anesthesia.  He has been holding his anticoagulation for last 5 days and wishes to proceed with bilateral hydrocele repair today.

## 2024-03-25 NOTE — PROGRESS NOTES
Subjective    Mr. Cadena is 69 y.o. male    Chief Complaint: Postoperative visit    History of Present Illness  Patient is a 69-year-old gentleman that initially saw me for scrotal swelling he underwent left hydrocelectomy and right spermatocelectomy 03/25/2024 with Dr. Acosta.  Surgery was uneventful and went as planned patient states she has had hardly any pain or no pain at all since the surgery.  As expected postoperative swelling both Penrose drains are secured in place no obvious drainage from the Penrose drains.  No evidence of tissue infection involving the scrotum.    The following portions of the patient's history were reviewed and updated as appropriate: allergies, current medications, past family history, past medical history, past social history, past surgical history and problem list.    Review of Systems   Constitutional:  Negative for chills and fever.   Gastrointestinal:  Negative for abdominal pain, anal bleeding and blood in stool.   Genitourinary:  Positive for scrotal swelling. Negative for dysuria and hematuria.         Current Outpatient Medications:     allopurinol (ZYLOPRIM) 100 MG tablet, Daily., Disp: , Rfl:     amLODIPine (NORVASC) 10 MG tablet, Take 1 tablet by mouth Daily., Disp: , Rfl: 3    atorvastatin (LIPITOR) 10 MG tablet, Take 1 tablet by mouth Daily., Disp: , Rfl:     B Complex Vitamins (VITAMIN B COMPLEX PO), Take 1 tablet by mouth Daily., Disp: , Rfl:     Berberine Chloride (BERBERINE HCI PO), Take 1 tablet by mouth Daily., Disp: , Rfl:     cetirizine (zyrTEC) 10 MG tablet, Take 1 tablet by mouth Daily As Needed for Allergies or Rhinitis., Disp: , Rfl:     coenzyme Q10 100 MG capsule, Take 1 capsule by mouth Daily., Disp: , Rfl:     docusate sodium (Colace) 100 MG capsule, Take 1 capsule by mouth 2 (Two) Times a Day., Disp: 60 capsule, Rfl: 1    losartan (COZAAR) 25 MG tablet, Take 4 tablets by mouth Daily., Disp: , Rfl:     LYCOPENE PO, Take 1 tablet by mouth Daily., Disp: ,  Rfl:     metFORMIN ER (GLUCOPHAGE-XR) 500 MG 24 hr tablet, Take 1 tablet by mouth Daily., Disp: , Rfl:     metoprolol succinate XL (TOPROL-XL) 100 MG 24 hr tablet, Daily., Disp: , Rfl:     multivitamin with minerals tablet tablet, Take 1 tablet by mouth Daily., Disp: , Rfl:     naproxen (NAPROSYN) 250 MG tablet, Take 1 tablet by mouth 2 (Two) Times a Day With Meals., Disp: 60 tablet, Rfl: 0    NON FORMULARY, Take 1 tablet by mouth Daily. RESBERATROL, Disp: , Rfl:     NON FORMULARY, Take 1 tablet by mouth Daily. curcurmin, Disp: , Rfl:     OIL OF OREGANO PO, Take 100 mg by mouth 2 (Two) Times a Day., Disp: , Rfl:     ondansetron ODT (ZOFRAN-ODT) 4 MG disintegrating tablet, Place 1 tablet on the tongue Every 6 (Six) Hours As Needed for Nausea., Disp: 6 tablet, Rfl: 1    saccharomyces boulardii (FLORASTOR) 250 MG capsule, Take 1 capsule by mouth Daily., Disp: , Rfl:     sulfamethoxazole-trimethoprim (BACTRIM DS,SEPTRA DS) 800-160 MG per tablet, Take 1 tablet by mouth 2 (Two) Times a Day for 7 days., Disp: 14 tablet, Rfl: 0    vitamin D3 (vitamin d) 125 MCG (5000 UT) capsule capsule, Take 1 capsule by mouth Daily., Disp: , Rfl:     Zinc Sulfate (ZINC 15 PO), Take 1 tablet by mouth Daily., Disp: , Rfl:     Past Medical History:   Diagnosis Date    Arthritis     Diabetes mellitus     Hx of colonic polyp     Hyperlipidemia     Hypertension     Sleep apnea     Stroke     TIA (transient ischemic attack)        Past Surgical History:   Procedure Laterality Date    COLONOSCOPY W/ POLYPECTOMY  11/11/2011    2 early tubular adenomas repeat exam in 5 years    COLONOSCOPY W/ POLYPECTOMY  12/19/2016    4 Tubular adenomas cecum and at 50 cm repeat exam in 3 years    HYDROCELECTOMY Bilateral 3/25/2024    Procedure: HYDROCELECTOMY;  Surgeon: Daniel Acosta MD;  Location: [facility];  Service: Urology;  Laterality: Bilateral;    URETERAL STENT INSERTION         Social History     Socioeconomic History    Marital status:     Tobacco Use    Smoking status: Never    Smokeless tobacco: Never   Vaping Use    Vaping status: Never Used   Substance and Sexual Activity    Alcohol use: No    Drug use: Defer    Sexual activity: Defer       Family History   Problem Relation Age of Onset    Colon cancer Neg Hx     Colon polyps Neg Hx        Objective    There were no vitals taken for this visit.    Physical Exam  Constitutional:       Appearance: Normal appearance.   HENT:      Head: Normocephalic and atraumatic.   Pulmonary:      Effort: Pulmonary effort is normal.   Genitourinary:     Comments: Scrotal swelling incision in the midline to scrotum appears well-healed no pus or drainage.  2 Penrose drains intact and in place with no drainage from either.  Neurological:      Mental Status: He is alert.             Results for orders placed or performed in visit on 03/28/24   POC Urinalysis Dipstick, Multipro    Specimen: Urine   Result Value Ref Range    Color Yellow Yellow, Straw, Dark Yellow, Anabel    Clarity, UA Clear Clear    Glucose, UA Negative Negative mg/dL    Bilirubin Negative Negative    Ketones, UA Negative Negative    Specific Gravity  1.015 1.005 - 1.030    Blood, UA Trace (A) Negative    pH, Urine 6.5 5.0 - 8.0    Protein, POC Negative Negative mg/dL    Urobilinogen, UA 0.2 E.U./dL Normal, 0.2 E.U./dL    Nitrite, UA Negative Negative    Leukocytes Small (1+) (A) Negative     Assessment and Plan    Diagnoses and all orders for this visit:    1. Postoperative visit (Primary)  -     POC Urinalysis Dipstick, Multipro    2. Hydrocele, unspecified hydrocele type    3. Spermatocele    I removed both Penrose drains without difficulty there is no active or obvious drainage from either.  Patient tolerated examination well states he had no pain since surgery no evidence of infection on exam.  Incision appears well-healed.  I had sent message to Dr. Acosta and patient can resume his Xarelto.  Continue light activity until seen by   Dave.    Follow-up as scheduled with Dr. Acosta.

## 2024-03-25 NOTE — ANESTHESIA POSTPROCEDURE EVALUATION
"Patient: Devin Cadena    Procedure Summary       Date: 03/25/24 Room / Location:  [facility] OR 09 /  [facility]    Anesthesia Start: 1245 Anesthesia Stop: 1414    Procedure: HYDROCELECTOMY (Bilateral: Scrotum) Diagnosis:       Hydrocele, unspecified hydrocele type      (Hydrocele, unspecified hydrocele type [N43.3])    Surgeons: Daniel Acosta MD Provider: Madhuri Ruiz CRNA    Anesthesia Type: general ASA Status: 3            Anesthesia Type: general    Vitals  Vitals Value Taken Time   /77 03/25/24 1506   Temp 97.5 °F (36.4 °C) 03/25/24 1506   Pulse 62 03/25/24 1509   Resp 11 03/25/24 1506   SpO2 95 % 03/25/24 1509   Vitals shown include unfiled device data.        Post Anesthesia Care and Evaluation    Patient location during evaluation: PACU  Patient participation: complete - patient participated  Level of consciousness: awake and alert  Pain management: adequate    Airway patency: patent  Anesthetic complications: No anesthetic complications  PONV Status: none  Cardiovascular status: acceptable and hemodynamically stable  Respiratory status: acceptable  Hydration status: acceptable    Comments: Blood pressure 164/85, pulse 58, temperature 97.5 °F (36.4 °C), resp. rate 16, height 173 cm (68.11\"), weight 129 kg (284 lb 6.3 oz), SpO2 93%.    Patient discharged from PACU based upon Alexander score. Please see RN notes for further details    "

## 2024-03-25 NOTE — BRIEF OP NOTE
HYDROCELECTOMY  Progress Note    Devin Cadena  3/25/2024    Pre-op Diagnosis:   Hydrocele, unspecified hydrocele type [N43.3]       Post-Op Diagnosis Codes:     * Hydrocele, unspecified hydrocele type [N43.3]    Procedure/CPT® Codes:        Procedure(s):  LEFT HYDROCELECTOMY,   RIGHT SPERMATOCELECTOMY              Surgeon(s):  Daniel Acosta MD    Anesthesia: General    Staff:   Circulator: Debra Dias RN; Annia Escudero RN  Scrub Person: Natividad Enriquez; June Tamayo         Estimated Blood Loss: minimal    Urine Voided: * No values recorded between 3/25/2024 12:45 PM and 3/25/2024  2:12 PM *    Specimens:                Specimens       ID Source Type Tests Collected By Collected At [facility]?    A Scrotum Skin TISSUE PATHOLOGY EXAM   Daniel Acosta MD 3/25/24 1320     Description: LEFT HYDROCELE SAC    B Scrotum Skin TISSUE PATHOLOGY EXAM   Daniel Acosta MD 3/25/24 1320     Description: RIGHT HYDROCELE SAC                  Drains: Bilateral scrotal penrose drains   Open Drain Groin (Active)   Site Description Unable to view 03/25/24 1412       Findings: Moderate left hydrocele.    Large right spermatocele mimicking hydrocele with severe desmoplastic reaction densely adherent to right scrotal wall and testicle, cyst wall was removed;        Complications: None        Daniel Acosta MD     Date: 3/25/2024  Time: 14:29 CDT

## 2024-03-25 NOTE — ANESTHESIA PROCEDURE NOTES
Airway  Date/Time: 3/25/2024 12:50 PM  Airway not difficult    General Information and Staff    Patient location during procedure: OR  CRNA/CAA: Madhuri Ruiz CRNA    Indications and Patient Condition  Indications for airway management: airway protection    Preoxygenated: yes  Mask difficulty assessment: 2 - vent by mask + OA or adjuvant +/- NMBA    Final Airway Details  Final airway type: endotracheal airway      Successful airway: ETT  Cuffed: yes   Successful intubation technique: video laryngoscopy  Facilitating devices/methods: intubating stylet and Bougie  Endotracheal tube insertion site: oral  Blade: Singh  Blade size: 4  ETT size (mm): 7.5  Cormack-Lehane Classification: grade IIa - partial view of glottis  Placement verified by: chest auscultation and capnometry   Cuff volume (mL): 7  Measured from: teeth  ETT/EBT  to teeth (cm): 21  Number of attempts at approach: 2  Assessment: lips, teeth, and gum same as pre-op and atraumatic intubation    Additional Comments  Anterior airway, VL w/ view but unable to pass ETT. VL w/ bougie and successful placement

## 2024-03-25 NOTE — OP NOTE
Operative Summary    Devin Cadena  Date of Procedure: 3/25/2024    Pre-op Diagnosis:   Hydrocele, unspecified hydrocele type [N43.3]    Post-op Diagnosis:     Post-Op Diagnosis Codes:     * Hydrocele, unspecified hydrocele type [N43.3]    Procedure/CPT® Codes:      Procedure(s):  1) LEFT HYDROCELECTOMY  2) RIGHT SPERMATOCELECTOMY    Surgeon(s):  Daniel Acosta MD    Anesthesia: General    Staff:   Circulator: Debra Dias RN; Annia Escudero RN  Scrub Person: Natividad Enriquez; June Tamayo    Indications for procedure:  69-year-old male with bilateral scrotal swellings reported to have bilateral [facility]celes [facility]    Findings:   Moderate simple appearing left hydrocele.    Large right spermatocele mimicking hydrocele with severe desmoplastic reaction densely adherent to right scrotal wall and testicle, cyst wall was removed;      Procedure details:  The patient is identified in the preoperative holding area.  The informed consent process had been completed In the office documented by my last progress note that included a discussion of the risks of this procedure, alternative management options, and the potential benefits of this procedure.  The patient voiced a good recollection of that discussion.  The patient voiced no additional questions.     The patient is taken to the operating room and given effective general anesthesia. The patient is then placed in the supine position .  The patient is then prepped and draped in the usual sterile fashion.  At this point appropriate timeout protocol was observed.    The left hydrocele was grasped in the left hand.  A vertical midline scrotal incision was placed and carried down sharply through the dartos to the tunica vaginalis.  The left hydrocele was delivered into the wound and cleared of surrounding tissues using a moistened Ray-Mayra.  Tunica vaginalis was opened sharply and the hydrocele was  drained approximately 300 cc of serosanguineous fluid.  The testicle was delivered and the appendix testis was cauterized.  The testicle appeared normal.  The excess hydrocele sac was excised using the Bovie.  The sac was then everted and reapproximated behind the testicle using a running stitch of 2-0 Vicryl after cauterizing the edges of the hydrocele sac for hemostasis.  A Penrose drain was placed through a separate opening in the inferior scrotum and secured with 2-0 Vicryl.  The testicle was returned to the scrotal compartment taking care to avoid torsion.      Attention was then turned to the right side.  The right tunica vaginalis was dissected sharply but dense desmoplastic reaction was encountered.  The tunica vaginalis was densely adherent to the midline scrotal septum and scrotal wall.  I was able to dissect the tunica vaginalis using electrocautery.  I then opened the cystic sac sharply and approximately 100 cc of yellow fluid was drained.  This appeared to be a paratesticular cystic swelling consistent with spermatocele.  It was densely adherent to the testicle and I could not visualize the testicle through the sac consistent with spermatocele.  The cyst wall was removed and a portion was sent for pathology.  The cyst edges were cauterized and oversewn using 2-0 Vicryl.  I placed another Penrose drain through dependent exit wound in the right hemiscrotum and secured this with 3-0 Vicryl.  The right testicle was returned to the right scrotal compartment taking care to avoid torsion.The wound was copiously irrigated.  Hemostasis appeared intact.  The wound was closed in multiple layers.  Dartos closed using 3 layers of running 3-0 chromic suture.  Skin was closed using a running horizontal mattress stitch of 3-0 chromic.  All sponge and needle counts were correct.  Bacitracin, fluffs, and scrotal support was applied.  Patient was awakened taken back to the recovery room in stable condition.        Estimated Blood Loss: Minimal    Specimens:                Specimens       ID Source Type Tests Collected By Collected At [facility]?    A Scrotum Skin TISSUE PATHOLOGY EXAM   Daniel Acosta MD 3/25/24 1320     Description: LEFT HYDROCELE SAC    B Scrotum Skin TISSUE PATHOLOGY EXAM   Daniel Acosta MD 3/25/24 1320     Description: RIGHT HYDROCELE SAC              Drains: Bilateral scrotal Penrose drain  Open Drain Groin (Active)   Site Description Unable to view 03/25/24 2271       Complications: none      Daniel Acosta MD     Date: 3/25/2024  Time: 18:01 CDT

## 2024-03-25 NOTE — ANESTHESIA PREPROCEDURE EVALUATION
Anesthesia Evaluation     Patient summary reviewed   no history of anesthetic complications:   NPO Solid Status: > 8 hours  NPO Liquid Status: > 8 hours           Airway   Mallampati: III  TM distance: >3 FB  No difficulty expected  Dental - normal exam     Pulmonary    (+) ,sleep apnea on CPAP  (-) asthma, not a smoker  Cardiovascular   Exercise tolerance: good (4-7 METS) (Limited by mobility, uses walker)    ECG reviewed    (+) hypertension, hyperlipidemia  (-) past MI, dysrhythmias, cardiac stents      Neuro/Psych  (+) TIA  (-) seizures  GI/Hepatic/Renal/Endo    (+) morbid obesity, diabetes mellitus  (-) liver disease, no renal disease    Musculoskeletal     Abdominal    Substance History      OB/GYN          Other                      Anesthesia Plan    ASA 3     general     intravenous induction     Anesthetic plan, risks, benefits, and alternatives have been provided, discussed and informed consent has been obtained with: patient.    CODE STATUS:

## 2024-03-26 ENCOUNTER — TELEPHONE (OUTPATIENT)
Dept: UROLOGY | Facility: CLINIC | Age: 69
End: 2024-03-26
Payer: MEDICARE

## 2024-03-27 LAB
CYTO UR: NORMAL
LAB AP CASE REPORT: NORMAL
Lab: NORMAL
PATH REPORT.FINAL DX SPEC: NORMAL
PATH REPORT.GROSS SPEC: NORMAL

## 2024-03-28 ENCOUNTER — TELEPHONE (OUTPATIENT)
Dept: UROLOGY | Facility: CLINIC | Age: 69
End: 2024-03-28
Payer: MEDICARE

## 2024-03-28 ENCOUNTER — OFFICE VISIT (OUTPATIENT)
Dept: UROLOGY | Facility: CLINIC | Age: 69
End: 2024-03-28
Payer: MEDICARE

## 2024-03-28 DIAGNOSIS — Z48.89 POSTOPERATIVE VISIT: Primary | ICD-10-CM

## 2024-03-28 DIAGNOSIS — N43.3 HYDROCELE, UNSPECIFIED HYDROCELE TYPE: ICD-10-CM

## 2024-03-28 DIAGNOSIS — N43.40 SPERMATOCELE: ICD-10-CM

## 2024-03-28 LAB
BILIRUB BLD-MCNC: NEGATIVE MG/DL
CLARITY, POC: CLEAR
COLOR UR: YELLOW
GLUCOSE UR STRIP-MCNC: NEGATIVE MG/DL
KETONES UR QL: NEGATIVE
LEUKOCYTE EST, POC: ABNORMAL
NITRITE UR-MCNC: NEGATIVE MG/ML
PH UR: 6.5 [PH] (ref 5–8)
PROT UR STRIP-MCNC: NEGATIVE MG/DL
RBC # UR STRIP: ABNORMAL /UL
SP GR UR: 1.01 (ref 1–1.03)
UROBILINOGEN UR QL: ABNORMAL

## 2024-03-28 RX ORDER — ATORVASTATIN CALCIUM 10 MG/1
10 TABLET, FILM COATED ORAL DAILY
COMMUNITY

## 2024-03-28 NOTE — PROGRESS NOTES
Subjective    Mr. Cadena is 69 y.o. male    Chief Complaint: Postoperative visit    History of Present Illness  69-year-old male established patient follow-up after left hydrocelectomy and right spermatocelectomy on 3/25/2024.  He is overall pleased, denies pain, and is feeling well.  His scrotal swelling is gradually subsiding.  Pathology was benign.    The following portions of the patient's history were reviewed and updated as appropriate: allergies, current medications, past family history, past medical history, past social history, past surgical history and problem list.    Review of Systems      Current Outpatient Medications:     allopurinol (ZYLOPRIM) 100 MG tablet, Daily., Disp: , Rfl:     amLODIPine (NORVASC) 10 MG tablet, Take 1 tablet by mouth Daily., Disp: , Rfl: 3    atorvastatin (LIPITOR) 10 MG tablet, Take 1 tablet by mouth Daily., Disp: , Rfl:     B Complex Vitamins (VITAMIN B COMPLEX PO), Take 1 tablet by mouth Daily., Disp: , Rfl:     Berberine Chloride (BERBERINE HCI PO), Take 1 tablet by mouth Daily., Disp: , Rfl:     cetirizine (zyrTEC) 10 MG tablet, Take 1 tablet by mouth Daily As Needed for Allergies or Rhinitis., Disp: , Rfl:     coenzyme Q10 100 MG capsule, Take 1 capsule by mouth Daily., Disp: , Rfl:     docusate sodium (Colace) 100 MG capsule, Take 1 capsule by mouth 2 (Two) Times a Day., Disp: 60 capsule, Rfl: 1    losartan (COZAAR) 25 MG tablet, Take 4 tablets by mouth Daily., Disp: , Rfl:     LYCOPENE PO, Take 1 tablet by mouth Daily., Disp: , Rfl:     metFORMIN ER (GLUCOPHAGE-XR) 500 MG 24 hr tablet, Take 1 tablet by mouth Daily., Disp: , Rfl:     metoprolol succinate XL (TOPROL-XL) 100 MG 24 hr tablet, Daily., Disp: , Rfl:     multivitamin with minerals tablet tablet, Take 1 tablet by mouth Daily., Disp: , Rfl:     naproxen (NAPROSYN) 250 MG tablet, Take 1 tablet by mouth 2 (Two) Times a Day With Meals., Disp: 60 tablet, Rfl: 0    NON FORMULARY, Take 1 tablet by mouth Daily.  "RESBERATROL, Disp: , Rfl:     NON FORMULARY, Take 1 tablet by mouth Daily. curcurmin, Disp: , Rfl:     OIL OF OREGANO PO, Take 100 mg by mouth 2 (Two) Times a Day., Disp: , Rfl:     ondansetron ODT (ZOFRAN-ODT) 4 MG disintegrating tablet, Place 1 tablet on the tongue Every 6 (Six) Hours As Needed for Nausea., Disp: 6 tablet, Rfl: 1    saccharomyces boulardii (FLORASTOR) 250 MG capsule, Take 1 capsule by mouth Daily., Disp: , Rfl:     vitamin D3 (vitamin d) 125 MCG (5000 UT) capsule capsule, Take 1 capsule by mouth Daily., Disp: , Rfl:     Zinc Sulfate (ZINC 15 PO), Take 1 tablet by mouth Daily., Disp: , Rfl:     Past Medical History:   Diagnosis Date    Arthritis     Diabetes mellitus     Hx of colonic polyp     Hyperlipidemia     Hypertension     Sleep apnea     Stroke     TIA (transient ischemic attack)        Past Surgical History:   Procedure Laterality Date    COLONOSCOPY W/ POLYPECTOMY  11/11/2011    2 early tubular adenomas repeat exam in 5 years    COLONOSCOPY W/ POLYPECTOMY  12/19/2016    4 Tubular adenomas cecum and at 50 cm repeat exam in 3 years    HYDROCELECTOMY Bilateral 3/25/2024    Procedure: HYDROCELECTOMY;  Surgeon: Daniel Acosta MD;  Location: [facility] OR;  Service: Urology;  Laterality: Bilateral;    URETERAL STENT INSERTION         Social History     Socioeconomic History    Marital status:    Tobacco Use    Smoking status: Never    Smokeless tobacco: Never   Vaping Use    Vaping status: Never Used   Substance and Sexual Activity    Alcohol use: No    Drug use: Defer    Sexual activity: Defer       Family History   Problem Relation Age of Onset    Colon cancer Neg Hx     Colon polyps Neg Hx        Objective    Temp 98.2 °F (36.8 °C)   Ht 175.3 cm (69\")   Wt 128 kg (283 lb)   BMI 41.79 kg/m²     Physical Exam  Midline scrotal wound clean, healing well.  Left scrotal swelling decreasing.  No signs of infection.      Results for orders placed or performed during the hospital encounter " of 04/06/24   Blood Culture - Blood, Arm, Right    Specimen: Arm, Right; Blood   Result Value Ref Range    Blood Culture No growth at 5 days    Comprehensive Metabolic Panel    Specimen: Arm, Right; Blood   Result Value Ref Range    Glucose 130 (H) 65 - 99 mg/dL    BUN 12 8 - 23 mg/dL    Creatinine 0.84 0.76 - 1.27 mg/dL    Sodium 137 136 - 145 mmol/L    Potassium 3.7 3.5 - 5.2 mmol/L    Chloride 101 98 - 107 mmol/L    CO2 26.0 22.0 - 29.0 mmol/L    Calcium 9.8 8.6 - 10.5 mg/dL    Total Protein 7.7 6.0 - 8.5 g/dL    Albumin 4.1 3.5 - 5.2 g/dL    ALT (SGPT) 28 1 - 41 U/L    AST (SGOT) 47 (H) 1 - 40 U/L    Alkaline Phosphatase 86 39 - 117 U/L    Total Bilirubin 0.5 0.0 - 1.2 mg/dL    Globulin 3.6 gm/dL    A/G Ratio 1.1 g/dL    BUN/Creatinine Ratio 14.3 7.0 - 25.0    Anion Gap 10.0 5.0 - 15.0 mmol/L    eGFR 93.2 >60.0 mL/min/1.73   Lactic Acid, Plasma    Specimen: Arm, Right; Blood   Result Value Ref Range    Lactate 1.3 0.5 - 2.0 mmol/L   Procalcitonin    Specimen: Arm, Right; Blood   Result Value Ref Range    Procalcitonin 0.05 0.00 - 0.25 ng/mL   C-reactive Protein    Specimen: Arm, Right; Blood   Result Value Ref Range    C-Reactive Protein 1.11 (H) 0.00 - 0.50 mg/dL   CK    Specimen: Arm, Right; Blood   Result Value Ref Range    Creatine Kinase 175 20 - 200 U/L   CBC Auto Differential    Specimen: Arm, Right; Blood   Result Value Ref Range    WBC 9.71 3.40 - 10.80 10*3/mm3    RBC 4.43 4.14 - 5.80 10*6/mm3    Hemoglobin 13.6 13.0 - 17.7 g/dL    Hematocrit 41.3 37.5 - 51.0 %    MCV 93.2 79.0 - 97.0 fL    MCH 30.7 26.6 - 33.0 pg    MCHC 32.9 31.5 - 35.7 g/dL    RDW 13.3 12.3 - 15.4 %    RDW-SD 45.7 37.0 - 54.0 fl    MPV 10.0 6.0 - 12.0 fL    Platelets 287 140 - 450 10*3/mm3    Neutrophil % 66.0 42.7 - 76.0 %    Lymphocyte % 22.7 19.6 - 45.3 %    Monocyte % 6.9 5.0 - 12.0 %    Eosinophil % 3.6 0.3 - 6.2 %    Basophil % 0.4 0.0 - 1.5 %    Immature Grans % 0.4 0.0 - 0.5 %    Neutrophils, Absolute 6.41 1.70 - 7.00  10*3/mm3    Lymphocytes, Absolute 2.20 0.70 - 3.10 10*3/mm3    Monocytes, Absolute 0.67 0.10 - 0.90 10*3/mm3    Eosinophils, Absolute 0.35 0.00 - 0.40 10*3/mm3    Basophils, Absolute 0.04 0.00 - 0.20 10*3/mm3    Immature Grans, Absolute 0.04 0.00 - 0.05 10*3/mm3    nRBC 0.0 0.0 - 0.2 /100 WBC     Assessment and Plan    Diagnoses and all orders for this visit:    1. Postoperative visit (Primary)      Overall healing well after left hydrocelectomy and right spermatocelectomy.  He will follow-up in July with Jamey Theodore      This document has been signed by ROSA Acosta MD on April 17, 2024 16:52 CDT

## 2024-03-28 NOTE — TELEPHONE ENCOUNTER
Called pt. He v/u    ----- Message from LORENA Disla sent at 3/28/2024 11:07 AM CDT -----  Regarding: RE: Neryrelto  Dr. Acosta said okay for patient to resume Xarelto  ----- Message -----  From: Daniel Acosta MD  Sent: 3/28/2024  10:41 AM CDT  To: LORENA Disla  Subject: RE: Xarelto                                      Yes, thanks  ----- Message -----  From: Jamey Theodore PA  Sent: 3/28/2024   9:56 AM CDT  To: Daniel Acosta MD  Subject: Xarelto                                          Patient had surgery at 03/25 removed Penrose drains today patient has no pain and healing well.  Just wanted to double check he is okay to restart Xarelto correct?

## 2024-04-06 ENCOUNTER — APPOINTMENT (OUTPATIENT)
Dept: CT IMAGING | Facility: HOSPITAL | Age: 69
End: 2024-04-06
Payer: MEDICARE

## 2024-04-06 ENCOUNTER — HOSPITAL ENCOUNTER (EMERGENCY)
Facility: HOSPITAL | Age: 69
Discharge: HOME | End: 2024-04-06
Attending: EMERGENCY MEDICINE
Payer: MEDICARE

## 2024-04-06 VITALS
DIASTOLIC BLOOD PRESSURE: 83 MMHG | HEIGHT: 69 IN | WEIGHT: 283 LBS | TEMPERATURE: 98.3 F | HEART RATE: 71 BPM | SYSTOLIC BLOOD PRESSURE: 167 MMHG | OXYGEN SATURATION: 98 % | RESPIRATION RATE: 18 BRPM | BODY MASS INDEX: 41.92 KG/M2

## 2024-04-06 DIAGNOSIS — T81.30XA WOUND DEHISCENCE: Primary | ICD-10-CM

## 2024-04-06 LAB
ALBUMIN SERPL-MCNC: 4.1 G/DL (ref 3.5–5.2)
ALBUMIN/GLOB SERPL: 1.1 G/DL
ALP SERPL-CCNC: 86 U/L (ref 39–117)
ALT SERPL W P-5'-P-CCNC: 28 U/L (ref 1–41)
ANION GAP SERPL CALCULATED.3IONS-SCNC: 10 MMOL/L (ref 5–15)
AST SERPL-CCNC: 47 U/L (ref 1–40)
BASOPHILS # BLD AUTO: 0.04 10*3/MM3 (ref 0–0.2)
BASOPHILS NFR BLD AUTO: 0.4 % (ref 0–1.5)
BILIRUB SERPL-MCNC: 0.5 MG/DL (ref 0–1.2)
BUN SERPL-MCNC: 12 MG/DL (ref 8–23)
BUN/CREAT SERPL: 14.3 (ref 7–25)
CALCIUM SPEC-SCNC: 9.8 MG/DL (ref 8.6–10.5)
CHLORIDE SERPL-SCNC: 101 MMOL/L (ref 98–107)
CK SERPL-CCNC: 175 U/L (ref 20–200)
CO2 SERPL-SCNC: 26 MMOL/L (ref 22–29)
CREAT SERPL-MCNC: 0.84 MG/DL (ref 0.76–1.27)
CRP SERPL-MCNC: 1.11 MG/DL (ref 0–0.5)
D-LACTATE SERPL-SCNC: 1.3 MMOL/L (ref 0.5–2)
DEPRECATED RDW RBC AUTO: 45.7 FL (ref 37–54)
EGFRCR SERPLBLD CKD-EPI 2021: 93.2 ML/MIN/1.73
EOSINOPHIL # BLD AUTO: 0.35 10*3/MM3 (ref 0–0.4)
EOSINOPHIL NFR BLD AUTO: 3.6 % (ref 0.3–6.2)
ERYTHROCYTE [DISTWIDTH] IN BLOOD BY AUTOMATED COUNT: 13.3 % (ref 12.3–15.4)
GLOBULIN UR ELPH-MCNC: 3.6 GM/DL
GLUCOSE SERPL-MCNC: 130 MG/DL (ref 65–99)
HCT VFR BLD AUTO: 41.3 % (ref 37.5–51)
HGB BLD-MCNC: 13.6 G/DL (ref 13–17.7)
IMM GRANULOCYTES # BLD AUTO: 0.04 10*3/MM3 (ref 0–0.05)
IMM GRANULOCYTES NFR BLD AUTO: 0.4 % (ref 0–0.5)
LYMPHOCYTES # BLD AUTO: 2.2 10*3/MM3 (ref 0.7–3.1)
LYMPHOCYTES NFR BLD AUTO: 22.7 % (ref 19.6–45.3)
MCH RBC QN AUTO: 30.7 PG (ref 26.6–33)
MCHC RBC AUTO-ENTMCNC: 32.9 G/DL (ref 31.5–35.7)
MCV RBC AUTO: 93.2 FL (ref 79–97)
MONOCYTES # BLD AUTO: 0.67 10*3/MM3 (ref 0.1–0.9)
MONOCYTES NFR BLD AUTO: 6.9 % (ref 5–12)
NEUTROPHILS NFR BLD AUTO: 6.41 10*3/MM3 (ref 1.7–7)
NEUTROPHILS NFR BLD AUTO: 66 % (ref 42.7–76)
NRBC BLD AUTO-RTO: 0 /100 WBC (ref 0–0.2)
PLATELET # BLD AUTO: 287 10*3/MM3 (ref 140–450)
PMV BLD AUTO: 10 FL (ref 6–12)
POTASSIUM SERPL-SCNC: 3.7 MMOL/L (ref 3.5–5.2)
PROCALCITONIN SERPL-MCNC: 0.05 NG/ML (ref 0–0.25)
PROT SERPL-MCNC: 7.7 G/DL (ref 6–8.5)
RBC # BLD AUTO: 4.43 10*6/MM3 (ref 4.14–5.8)
SODIUM SERPL-SCNC: 137 MMOL/L (ref 136–145)
WBC NRBC COR # BLD AUTO: 9.71 10*3/MM3 (ref 3.4–10.8)

## 2024-04-06 PROCEDURE — 87040 BLOOD CULTURE FOR BACTERIA: CPT | Performed by: EMERGENCY MEDICINE

## 2024-04-06 PROCEDURE — 82550 ASSAY OF CK (CPK): CPT | Performed by: EMERGENCY MEDICINE

## 2024-04-06 PROCEDURE — 99024 POSTOP FOLLOW-UP VISIT: CPT | Performed by: UROLOGY

## 2024-04-06 PROCEDURE — 85025 COMPLETE CBC W/AUTO DIFF WBC: CPT | Performed by: EMERGENCY MEDICINE

## 2024-04-06 PROCEDURE — 99283 EMERGENCY DEPT VISIT LOW MDM: CPT

## 2024-04-06 PROCEDURE — 80053 COMPREHEN METABOLIC PANEL: CPT | Performed by: EMERGENCY MEDICINE

## 2024-04-06 PROCEDURE — 83605 ASSAY OF LACTIC ACID: CPT | Performed by: EMERGENCY MEDICINE

## 2024-04-06 PROCEDURE — 84145 PROCALCITONIN (PCT): CPT | Performed by: EMERGENCY MEDICINE

## 2024-04-06 PROCEDURE — 86140 C-REACTIVE PROTEIN: CPT | Performed by: EMERGENCY MEDICINE

## 2024-04-06 RX ORDER — SULFAMETHOXAZOLE AND TRIMETHOPRIM 800; 160 MG/1; MG/1
1 TABLET ORAL 2 TIMES DAILY
Qty: 20 TABLET | Refills: 0 | Status: SHIPPED | OUTPATIENT
Start: 2024-04-06 | End: 2024-04-16

## 2024-04-06 RX ORDER — SODIUM CHLORIDE 0.9 % (FLUSH) 0.9 %
10 SYRINGE (ML) INJECTION AS NEEDED
Status: DISCONTINUED | OUTPATIENT
Start: 2024-04-06 | End: 2024-04-06 | Stop reason: HOSPADM

## 2024-04-06 RX ORDER — SULFAMETHOXAZOLE AND TRIMETHOPRIM 800; 160 MG/1; MG/1
1 TABLET ORAL 2 TIMES DAILY
Qty: 20 TABLET | Refills: 0 | Status: SHIPPED | OUTPATIENT
Start: 2024-04-06 | End: 2024-04-06

## 2024-04-06 NOTE — CONSULTS
"Referring Provider: Nohemi  Reason for Consultation: Scrotal wound drainage    Chief complaint: \"My [scrotal wound] looks bad.\"    Subjective     History of present illness:    Mr. Cadena is a  pleasant 69 year old male with complaints of scrotal wound changes and some drainage after scrotal surgery about 2 weeks ago with Dr. Acosta.    Op Note by Daniel Acosta MD (03/25/2024 13:07)     He came to our ER for work up.    CK (04/06/2024 12:22)    C-reactive Protein (04/06/2024 12:22)    Procalcitonin (04/06/2024 12:22)    Lactic Acid, Plasma (04/06/2024 12:22)    Comprehensive Metabolic Panel (04/06/2024 12:22)    CBC & Differential (04/06/2024 12:22)    Denies fevers and chills. Minimal tenderness. No purulence reported.    Review of Systems  Pertinent items are noted in HPI     History  Past Medical History:   Diagnosis Date    Arthritis     Diabetes mellitus     Hx of colonic polyp     Hyperlipidemia     Hypertension     Sleep apnea     Stroke     TIA (transient ischemic attack)        Past Surgical History:   Procedure Laterality Date    COLONOSCOPY W/ POLYPECTOMY  11/11/2011    2 early tubular adenomas repeat exam in 5 years    COLONOSCOPY W/ POLYPECTOMY  12/19/2016    4 Tubular adenomas cecum and at 50 cm repeat exam in 3 years    HYDROCELECTOMY Bilateral 3/25/2024    Procedure: HYDROCELECTOMY;  Surgeon: Daniel Acosta MD;  Location: [facility];  Service: Urology;  Laterality: Bilateral;    URETERAL STENT INSERTION         Family History   Problem Relation Age of Onset    Colon cancer Neg Hx     Colon polyps Neg Hx        Social History     Socioeconomic History    Marital status:    Tobacco Use    Smoking status: Never    Smokeless tobacco: Never   Vaping Use    Vaping status: Never Used   Substance and Sexual Activity    Alcohol use: No    Drug use: Defer    Sexual activity: Defer       Current Facility-Administered Medications   Medication Dose Route Frequency Provider Last Rate Last Admin    " sodium chloride 0.9 % flush 10 mL  10 mL Intravenous PRN Narayan Song MD        sodium chloride 0.9 % flush 10 mL  10 mL Intravenous PRN Narayan Song MD         Current Outpatient Medications   Medication Sig Dispense Refill    allopurinol (ZYLOPRIM) 100 MG tablet Daily.      amLODIPine (NORVASC) 10 MG tablet Take 1 tablet by mouth Daily.  3    atorvastatin (LIPITOR) 10 MG tablet Take 1 tablet by mouth Daily.      B Complex Vitamins (VITAMIN B COMPLEX PO) Take 1 tablet by mouth Daily.      Berberine Chloride (BERBERINE HCI PO) Take 1 tablet by mouth Daily.      cetirizine (zyrTEC) 10 MG tablet Take 1 tablet by mouth Daily As Needed for Allergies or Rhinitis.      coenzyme Q10 100 MG capsule Take 1 capsule by mouth Daily.      docusate sodium (Colace) 100 MG capsule Take 1 capsule by mouth 2 (Two) Times a Day. 60 capsule 1    losartan (COZAAR) 25 MG tablet Take 4 tablets by mouth Daily.      LYCOPENE PO Take 1 tablet by mouth Daily.      metFORMIN ER (GLUCOPHAGE-XR) 500 MG 24 hr tablet Take 1 tablet by mouth Daily.      metoprolol succinate XL (TOPROL-XL) 100 MG 24 hr tablet Daily.      multivitamin with minerals tablet tablet Take 1 tablet by mouth Daily.      naproxen (NAPROSYN) 250 MG tablet Take 1 tablet by mouth 2 (Two) Times a Day With Meals. 60 tablet 0    NON FORMULARY Take 1 tablet by mouth Daily. RESBERATROL      NON FORMULARY Take 1 tablet by mouth Daily. curcurmin      OIL OF OREGANO PO Take 100 mg by mouth 2 (Two) Times a Day.      ondansetron ODT (ZOFRAN-ODT) 4 MG disintegrating tablet Place 1 tablet on the tongue Every 6 (Six) Hours As Needed for Nausea. 6 tablet 1    saccharomyces boulardii (FLORASTOR) 250 MG capsule Take 1 capsule by mouth Daily.      vitamin D3 (vitamin d) 125 MCG (5000 UT) capsule capsule Take 1 capsule by mouth Daily.      Zinc Sulfate (ZINC 15 PO) Take 1 tablet by mouth Daily.          No Known Allergies    Objective     Vital Signs   Temp:  [97.7 °F (36.5 °C)] 97.7 °F (36.5  °C)  Heart Rate:  [65] 65  Resp:  [17] 17  BP: (178)/(79) 178/79  No intake or output data in the 24 hours ending 04/06/24 1305    Physical Exam:    General: Alert, cooperative, nontoxic, comfortable  Head:  Normocephalic, without obvious abnormality, atraumatic  Eyes:   Lids and lashes normal, no icterus, no pallor  Ears:  Ears appear intact with no abnormalities noted  Neck:  Supple  Back:  No costovertebral angle tenderness  Lungs: Unlabored respirations  Heart:  Regular rhythm and normal rate  Abdomen: Soft, nontender, nondistended  :  Scrotal and penile swelling (reported stable since surgery). No drainage from wound. Mild superficial separation noted. Very minimal redness. No crepitus. No necrotic tissue. No palpable abscess noted.   Extremities: Moves all extremities well  Skin:  Warm and dry  Neurologic: Cranial nerves 2 - 12 grossly intact    Data Review:    As above    Assessment and Plan:    Superficial scrotal dehiscence. Light duty. Elevate scrotum. OTC antibiotic ointment to wound twice daily. Follow up with Dr. Acosta as scheduled. Recommend PO antibiotics x 10 days (I.e. bactrim). No imaging recommended at this time.     I discussed the patient's findings and my recommendations with patient, family, and Dr. Song    (Please note that portions of this note were completed with a voice recognition program.)    Dean Cruz MD  04/06/24  13:05 CDT    Time: Time spent: 30 minutes spent performing evaluation and management, chart review, and discussion with patient, > 50% of time spent in face-to-face encounter

## 2024-04-06 NOTE — ED PROVIDER NOTES
Subjective   History of Present Illness  Patient recently had reseal surgery done by Dr. Acosta.  Today the patient was brought by family because the wife and noted some bleeding and separation of the wound.  On examination the patient has got a large scrotal and an invaginated penile shaft.  The patient's wife states that his penis looks better than before but the scrotum is larger than usual.  Examination of scrotum reveals redness and erythema around incision site .  And there is mild wound dehiscence also.  There is no evidence of crepitus.  There is no extension of the perineal area at this time.  The patient does not appear toxic is not febrile not tachycardic.  Discussed with urology.    Testicle Pain  Location:  Patient not have any significant pain but there is some redness erythema to the scrotum.  Severity:  Moderate  Onset quality:  Gradual  Duration:  1 week  Timing:  Constant  Progression:  Worsening  Associated symptoms: no abdominal pain, no chest pain, no congestion, no cough, no fatigue, no fever, no headaches, no loss of consciousness, no myalgias, no nausea, no rash, no rhinorrhea, no shortness of breath, no sore throat and no vomiting        Review of Systems   Constitutional: Negative.  Negative for chills, fatigue and fever.   HENT: Negative.  Negative for congestion, rhinorrhea and sore throat.    Respiratory: Negative.  Negative for cough, chest tightness, shortness of breath and stridor.    Cardiovascular: Negative.  Negative for chest pain.   Gastrointestinal: Negative.  Negative for abdominal distention, abdominal pain, nausea and vomiting.   Endocrine: Negative.    Genitourinary:  Positive for testicular pain. Negative for difficulty urinating and flank pain.   Musculoskeletal: Negative.  Negative for myalgias.   Skin: Negative.  Negative for color change and rash.   Neurological: Negative.  Negative for dizziness, loss of consciousness and headaches.   All other systems reviewed and are  negative.      Past Medical History:   Diagnosis Date    Arthritis     Diabetes mellitus     Hx of colonic polyp     Hyperlipidemia     Hypertension     Sleep apnea     Stroke     TIA (transient ischemic attack)        No Known Allergies    Past Surgical History:   Procedure Laterality Date    COLONOSCOPY W/ POLYPECTOMY  11/11/2011    2 early tubular adenomas repeat exam in 5 years    COLONOSCOPY W/ POLYPECTOMY  12/19/2016    4 Tubular adenomas cecum and at 50 cm repeat exam in 3 years    HYDROCELECTOMY Bilateral 3/25/2024    Procedure: HYDROCELECTOMY;  Surgeon: Daniel Acosta MD;  Location: [facility] OR;  Service: Urology;  Laterality: Bilateral;    URETERAL STENT INSERTION         Family History   Problem Relation Age of Onset    Colon cancer Neg Hx     Colon polyps Neg Hx        Social History     Socioeconomic History    Marital status:    Tobacco Use    Smoking status: Never    Smokeless tobacco: Never   Vaping Use    Vaping status: Never Used   Substance and Sexual Activity    Alcohol use: No    Drug use: Defer    Sexual activity: Defer           Objective   Physical Exam  Vitals and nursing note reviewed. Exam conducted with a chaperone present.   Constitutional:       General: He is awake.      Appearance: Normal appearance. He is well-developed. He is not ill-appearing.   HENT:      Head: Normocephalic and atraumatic.   Eyes:      General: Lids are normal.      Conjunctiva/sclera: Conjunctivae normal.      Pupils: Pupils are equal, round, and reactive to light.   Cardiovascular:      Rate and Rhythm: Normal rate and regular rhythm.      Chest Wall: PMI is not displaced.      Pulses: Normal pulses.      Heart sounds: Normal heart sounds.   Pulmonary:      Effort: Pulmonary effort is normal.      Breath sounds: Normal breath sounds. No decreased breath sounds.   Abdominal:      General: Abdomen is flat. Bowel sounds are normal. There is no distension.      Palpations: Abdomen is soft.      Tenderness:  There is no abdominal tenderness. There is no guarding.   Genitourinary:     Comments: Large scrotal sac with no tenderness on palpation incision appears mildly dehisced with erythema and no induration.  No crepitus.  No extension into the peroneal  space.  Musculoskeletal:         General: Normal range of motion.      Cervical back: Full passive range of motion without pain, normal range of motion and neck supple.   Skin:     General: Skin is warm and dry.      Capillary Refill: Capillary refill takes less than 2 seconds.   Neurological:      General: No focal deficit present.      Mental Status: He is alert and oriented to person, place, and time.      Motor: Motor function is intact.      Deep Tendon Reflexes: Reflexes are normal and symmetric.   Psychiatric:         Behavior: Behavior is cooperative.         Procedures           ED Course  ED Course as of 04/06/24 1318   Sat Apr 06, 2024   1214 Nancy GUERRA consulted [TS]   1312 Dr. Cruz came and evaluate the patient he has canceled the CAT scan.  And is discharging the patient home Bactrim [TS]   1312 Patient's lab workup was negative CRP is negative bicycled within normal limits.  Procalcitonin Evatt is negative. [TS]      ED Course User Index  [TS] Narayan Song MD                                             Medical Decision Making  Patient with possible localized wound infection versus abscess formation.  Will get lab workup and a CAT scan urology has been consulted    Problems Addressed:  Wound dehiscence:     Details: Mild wound dehiscence superficially.  There are some redness around it.  Seen by urology in the ED recommendation is to discharge patient home on Bactrim.    Amount and/or Complexity of Data Reviewed  Labs: ordered.     Details: Labs reviewed  Radiology: ordered.  Discussion of management or test interpretation with external provider(s): Discussed with Dr. Nancy GUERRA who came and saw the patient    Risk  Prescription drug  management.  Risk Details: Patient will be discharged home there is no evidence of Lexx's gangrene CRP is negative.LRINEC score is low risk.  Patient has been recommended to follow-up with the primary MD and urology return the ER for any worsening symptoms.        Final diagnoses:   Wound dehiscence       ED Disposition  ED Disposition       ED Disposition   Discharge    Condition   Stable    Comment   --               Daniel Acosta MD  [address]  [address] [facility] [address]  [facility] [address]  [phone]    Schedule an appointment as soon as possible for a visit            Medication List        New Prescriptions      sulfamethoxazole-trimethoprim 800-160 MG per tablet  Commonly known as: BACTRIM DS,SEPTRA DS  Take 1 tablet by mouth 2 (Two) Times a Day for 10 days.               Where to Get Your Medications        These medications were sent to [id number] - [place], [place] - [address] [facility] - [phone]  - [phone] [address] [address]      Phone: [phone]   sulfamethoxazole-trimethoprim 800-160 MG per tablet            Narayan Song MD  04/06/24 2409       Narayan Song MD  04/06/24 4273

## 2024-04-11 LAB — BACTERIA SPEC AEROBE CULT: NORMAL

## 2024-04-17 ENCOUNTER — OFFICE VISIT (OUTPATIENT)
Dept: UROLOGY | Facility: CLINIC | Age: 69
End: 2024-04-17
Payer: MEDICARE

## 2024-04-17 VITALS — TEMPERATURE: 98.2 F | WEIGHT: 283 LBS | HEIGHT: 69 IN | BODY MASS INDEX: 41.92 KG/M2

## 2024-04-17 DIAGNOSIS — Z48.89 POSTOPERATIVE VISIT: Primary | ICD-10-CM

## 2024-04-17 PROCEDURE — 99024 POSTOP FOLLOW-UP VISIT: CPT | Performed by: UROLOGY

## 2024-04-17 PROCEDURE — 1160F RVW MEDS BY RX/DR IN RCRD: CPT | Performed by: UROLOGY

## 2024-04-17 PROCEDURE — 1159F MED LIST DOCD IN RCRD: CPT | Performed by: UROLOGY

## 2024-07-05 NOTE — PROGRESS NOTES
Subjective    Mr. Cadena is 69 y.o. male    Chief Complaint: Hydrocele     History of Present Illness  Patient is a 69-year-old gentleman who presents for 3-month follow-up patient had left hydrocelectomy right spermatocelectomy done by Dr. Acosta 03/25/2024.  Overall his recovery has been uneventful he did have to go to the emergency department 04/06/2024 due to scrotal wound drainage but this has long resolved.  Patient states he is doing well he has no pain except for some mild tenderness on the incision site no redness scrotum significantly decreased in size from previous.      The following portions of the patient's history were reviewed and updated as appropriate: allergies, current medications, past family history, past medical history, past social history, past surgical history and problem list.    Review of Systems   Genitourinary:  Positive for scrotal swelling.         Current Outpatient Medications:     amLODIPine (NORVASC) 10 MG tablet, Take 1 tablet by mouth Daily., Disp: , Rfl: 3    atorvastatin (LIPITOR) 10 MG tablet, Take 1 tablet by mouth Daily., Disp: , Rfl:     B Complex Vitamins (VITAMIN B COMPLEX PO), Take 1 tablet by mouth Daily., Disp: , Rfl:     Berberine Chloride (BERBERINE HCI PO), Take 1 tablet by mouth Daily., Disp: , Rfl:     cetirizine (zyrTEC) 10 MG tablet, Take 1 tablet by mouth Daily As Needed for Allergies or Rhinitis., Disp: , Rfl:     coenzyme Q10 100 MG capsule, Take 1 capsule by mouth Daily., Disp: , Rfl:     docusate sodium (Colace) 100 MG capsule, Take 1 capsule by mouth 2 (Two) Times a Day., Disp: 60 capsule, Rfl: 1    losartan (COZAAR) 25 MG tablet, Take 4 tablets by mouth Daily., Disp: , Rfl:     LYCOPENE PO, Take 1 tablet by mouth Daily., Disp: , Rfl:     metFORMIN ER (GLUCOPHAGE-XR) 500 MG 24 hr tablet, Take 1 tablet by mouth Daily., Disp: , Rfl:     metoprolol succinate XL (TOPROL-XL) 100 MG 24 hr tablet, Daily., Disp: , Rfl:     multivitamin with minerals tablet  tablet, Take 1 tablet by mouth Daily., Disp: , Rfl:     naproxen (NAPROSYN) 250 MG tablet, Take 1 tablet by mouth 2 (Two) Times a Day With Meals., Disp: 60 tablet, Rfl: 0    NON FORMULARY, Take 1 tablet by mouth Daily. RESBERATROL, Disp: , Rfl:     NON FORMULARY, Take 1 tablet by mouth Daily. curcurmin, Disp: , Rfl:     OIL OF OREGANO PO, Take 100 mg by mouth 2 (Two) Times a Day., Disp: , Rfl:     ondansetron ODT (ZOFRAN-ODT) 4 MG disintegrating tablet, Place 1 tablet on the tongue Every 6 (Six) Hours As Needed for Nausea., Disp: 6 tablet, Rfl: 1    saccharomyces boulardii (FLORASTOR) 250 MG capsule, Take 1 capsule by mouth Daily., Disp: , Rfl:     vitamin D3 (vitamin d) 125 MCG (5000 UT) capsule capsule, Take 1 capsule by mouth Daily., Disp: , Rfl:     Zinc Sulfate (ZINC 15 PO), Take 1 tablet by mouth Daily., Disp: , Rfl:     allopurinol (ZYLOPRIM) 100 MG tablet, Daily. (Patient not taking: Reported on 7/17/2024), Disp: , Rfl:     Past Medical History:   Diagnosis Date    Arthritis     Diabetes mellitus     Hx of colonic polyp     Hyperlipidemia     Hypertension     Sleep apnea     Stroke     TIA (transient ischemic attack)        Past Surgical History:   Procedure Laterality Date    COLONOSCOPY W/ POLYPECTOMY  11/11/2011    2 early tubular adenomas repeat exam in 5 years    COLONOSCOPY W/ POLYPECTOMY  12/19/2016    4 Tubular adenomas cecum and at 50 cm repeat exam in 3 years    HYDROCELECTOMY Bilateral 3/25/2024    Procedure: HYDROCELECTOMY;  Surgeon: Daniel Acosta MD;  Location: [facility];  Service: Urology;  Laterality: Bilateral;    URETERAL STENT INSERTION         Social History     Socioeconomic History    Marital status:    Tobacco Use    Smoking status: Never    Smokeless tobacco: Never   Vaping Use    Vaping status: Never Used   Substance and Sexual Activity    Alcohol use: No    Drug use: Defer    Sexual activity: Defer       Family History   Problem Relation Age of Onset    Colon cancer Neg  "Hx     Colon polyps Neg Hx        Objective    Temp 97.6 °F (36.4 °C)   Ht 175.3 cm (69\")   Wt 130 kg (287 lb)   BMI 42.38 kg/m²     Physical Exam  Constitutional:       Appearance: Normal appearance.   HENT:      Head: Normocephalic and atraumatic.   Pulmonary:      Effort: Pulmonary effort is normal.   Genitourinary:     Comments: No erythema tenderness or evidence of infection of the scrotum some edema but this is his normal scrotal size significantly decreased in size from previous.  Skin:     Coloration: Skin is not pale.   Neurological:      Mental Status: He is alert.   Psychiatric:         Mood and Affect: Mood normal.         Behavior: Behavior normal.             Results for orders placed or performed in visit on 07/17/24   POC Urinalysis Dipstick, Multipro    Specimen: Urine   Result Value Ref Range    Color Yellow Yellow, Straw, Dark Yellow, Anabel    Clarity, UA Clear Clear    Glucose, UA Negative Negative mg/dL    Bilirubin Negative Negative    Ketones, UA Negative Negative    Specific Gravity  1.020 1.005 - 1.030    Blood, UA Trace (A) Negative    pH, Urine 6.5 5.0 - 8.0    Protein,  mg/dL (A) Negative mg/dL    Urobilinogen, UA 0.2 E.U./dL Normal, 0.2 E.U./dL    Nitrite, UA Negative Negative    Leukocytes Trace (A) Negative     Assessment and Plan    Diagnoses and all orders for this visit:    1. Hydrocele, unspecified hydrocele type (Primary)  -     POC Urinalysis Dipstick, Multipro        Patient healed with no complications or symptoms at this time patient is happy with surgery.    Can return as needed.        "

## 2024-07-17 ENCOUNTER — OFFICE VISIT (OUTPATIENT)
Dept: UROLOGY | Facility: CLINIC | Age: 69
End: 2024-07-17
Payer: MEDICARE

## 2024-07-17 VITALS — TEMPERATURE: 97.6 F | BODY MASS INDEX: 42.51 KG/M2 | HEIGHT: 69 IN | WEIGHT: 287 LBS

## 2024-07-17 DIAGNOSIS — N43.3 HYDROCELE, UNSPECIFIED HYDROCELE TYPE: Primary | ICD-10-CM

## 2024-07-17 LAB
BILIRUB BLD-MCNC: NEGATIVE MG/DL
CLARITY, POC: CLEAR
COLOR UR: YELLOW
GLUCOSE UR STRIP-MCNC: NEGATIVE MG/DL
KETONES UR QL: NEGATIVE
LEUKOCYTE EST, POC: ABNORMAL
NITRITE UR-MCNC: NEGATIVE MG/ML
PH UR: 6.5 [PH] (ref 5–8)
PROT UR STRIP-MCNC: ABNORMAL MG/DL
RBC # UR STRIP: ABNORMAL /UL
SP GR UR: 1.02 (ref 1–1.03)
UROBILINOGEN UR QL: ABNORMAL

## 2025-06-24 ENCOUNTER — OFFICE VISIT (OUTPATIENT)
Dept: GASTROENTEROLOGY | Facility: CLINIC | Age: 70
End: 2025-06-24
Payer: MEDICARE

## 2025-06-24 VITALS
OXYGEN SATURATION: 95 % | DIASTOLIC BLOOD PRESSURE: 80 MMHG | HEIGHT: 69 IN | WEIGHT: 279 LBS | SYSTOLIC BLOOD PRESSURE: 130 MMHG | BODY MASS INDEX: 41.32 KG/M2 | HEART RATE: 54 BPM | TEMPERATURE: 97.8 F

## 2025-06-24 DIAGNOSIS — Z78.9 NONSMOKER: ICD-10-CM

## 2025-06-24 DIAGNOSIS — K59.01 SLOW TRANSIT CONSTIPATION: ICD-10-CM

## 2025-06-24 DIAGNOSIS — D12.6 ADENOMATOUS POLYP OF COLON, UNSPECIFIED PART OF COLON: ICD-10-CM

## 2025-06-24 DIAGNOSIS — Z79.01 ANTICOAGULATED: ICD-10-CM

## 2025-06-24 DIAGNOSIS — R19.4 CHANGE IN BOWEL HABITS: ICD-10-CM

## 2025-06-24 DIAGNOSIS — K62.5 BRBPR (BRIGHT RED BLOOD PER RECTUM): Primary | ICD-10-CM

## 2025-06-24 DIAGNOSIS — I10 HTN (HYPERTENSION), BENIGN: ICD-10-CM

## 2025-06-24 PROCEDURE — 1160F RVW MEDS BY RX/DR IN RCRD: CPT | Performed by: CLINICAL NURSE SPECIALIST

## 2025-06-24 PROCEDURE — 1159F MED LIST DOCD IN RCRD: CPT | Performed by: CLINICAL NURSE SPECIALIST

## 2025-06-24 PROCEDURE — 99204 OFFICE O/P NEW MOD 45 MIN: CPT | Performed by: CLINICAL NURSE SPECIALIST

## 2025-06-24 RX ORDER — FUROSEMIDE 20 MG/1
20 TABLET ORAL DAILY
COMMUNITY

## 2025-06-24 RX ORDER — SODIUM, POTASSIUM,MAG SULFATES 17.5-3.13G
SOLUTION, RECONSTITUTED, ORAL ORAL
Qty: 177 ML | Refills: 0 | Status: SHIPPED | OUTPATIENT
Start: 2025-06-24

## 2025-06-24 NOTE — PROGRESS NOTES
Devin Cadena  1955    6/24/2025  Chief Complaint   Patient presents with    GI Problem     Rectal bleeding, constipation,colon recall-hx of polyps     Subjective   HPI  Devinbrittany Cadena      The patient is a 70-year-old male who presents for evaluation of rectal bleeding and constipation.    He reports experiencing minor rectal bleeding, described as small spots, which he first noticed approximately a month ago during a period of constipation. The bleeding was observed on the tissue paper post-defecation. He has not experienced any further bleeding since the initial episode. He does not report any associated symptoms such as fever, chills, sweats, or weight loss. No abdominal pain.     He sought medical attention at [facility] where he underwent an enema and x-ray examination. The results were normal, indicating only constipation. His bowel movements are now irregular, occurring every three days. He is not currently using MiraLAX or any other laxatives. He maintains a high water intake.    PAST SURGICAL HISTORY:  Colonoscopy with polyp removal and hemorrhoids noted on 03/02/2020 with Dr Bro.    FAMILY HISTORY  - Negative for colon cancer     Results  Imaging   - KUB: 05/27/2025, Mild amount of retained colonic stool, no evidence for mass, no calcifications.     Past Medical History:   Diagnosis Date    Arthritis     Diabetes mellitus     Hx of colonic polyp     Hyperlipidemia     Hypertension     Sleep apnea     Stroke     TIA (transient ischemic attack)      Past Surgical History:   Procedure Laterality Date    COLONOSCOPY  03/02/2020    Dr. bro-small hyperplastic  polyp at 20 cm, tubular adenoma polyp at 40 cm, diverticulosis in the left colon, hemorrhoids    COLONOSCOPY W/ POLYPECTOMY  11/11/2011    2 early tubular adenomas repeat exam in 5 years    COLONOSCOPY W/ POLYPECTOMY  12/19/2016    4 Tubular adenomas cecum and at 50 cm repeat exam in 3 years    HYDROCELECTOMY Bilateral 03/25/2024     Procedure: HYDROCELECTOMY;  Surgeon: Daniel Acosta MD;  Location: [facility];  Service: Urology;  Laterality: Bilateral;    URETERAL STENT INSERTION         Outpatient Medications Marked as Taking for the 6/24/25 encounter (Office Visit) with Corazon Lee APRN   Medication Sig Dispense Refill    allopurinol (ZYLOPRIM) 100 MG tablet Daily.      B Complex Vitamins (VITAMIN B COMPLEX PO) Take 1 tablet by mouth Daily.      docusate sodium (Colace) 100 MG capsule Take 1 capsule by mouth 2 (Two) Times a Day. 60 capsule 1    furosemide (LASIX) 20 MG tablet Take 1 tablet by mouth Daily.      losartan (COZAAR) 25 MG tablet Take 4 tablets by mouth Daily.      metoprolol succinate XL (TOPROL-XL) 100 MG 24 hr tablet Daily.      MILK THISTLE PO Take 7,500 mg by mouth Daily.      NON FORMULARY Omegas turmeric      rivaroxaban (XARELTO) 20 MG tablet Take 1 tablet by mouth Daily.      Specialty Vitamins Products (JOINT/BONE VITALITY PO) Take  by mouth.      Specialty Vitamins Products (LONGEVITY PO) Take  by mouth.      vitamin D3 (vitamin d) 125 MCG (5000 UT) capsule capsule Take 1 capsule by mouth Daily.       No Known Allergies  Social History     Socioeconomic History    Marital status:    Tobacco Use    Smoking status: Never    Smokeless tobacco: Never   Vaping Use    Vaping status: Never Used   Substance and Sexual Activity    Alcohol use: No    Drug use: Never    Sexual activity: Defer     Family History   Problem Relation Age of Onset    Colon cancer Neg Hx     Colon polyps Neg Hx      Health Maintenance   Topic Date Due    Pneumococcal Vaccine 50+ (1 of 1 - PCV) Never done    ZOSTER VACCINE (1 of 2) Never done    ANNUAL WELLNESS VISIT  Never done    TDAP/TD VACCINES (5 - Td or Tdap) 12/17/2023    COVID-19 Vaccine (4 - 2024-25 season) 09/01/2024    COLORECTAL CANCER SCREENING  03/02/2025    INFLUENZA VACCINE  07/01/2025    HEPATITIS C SCREENING  Completed     Review of Systems   Constitutional:  Negative  "for activity change, appetite change, chills, diaphoresis, fatigue, fever and unexpected weight change.   HENT:  Negative for ear pain, hearing loss, mouth sores, sore throat, trouble swallowing and voice change.    Eyes: Negative.    Respiratory:  Negative for cough, choking, shortness of breath and wheezing.    Cardiovascular:  Negative for chest pain and palpitations.   Gastrointestinal:  Positive for constipation. Negative for abdominal pain, blood in stool, diarrhea, nausea and vomiting.   Endocrine: Negative for cold intolerance and heat intolerance.   Genitourinary:  Negative for decreased urine volume, dysuria, frequency, hematuria and urgency.   Musculoskeletal:  Negative for back pain, gait problem and myalgias.   Skin:  Negative for color change, pallor and rash.   Allergic/Immunologic: Negative for food allergies and immunocompromised state.   Neurological:  Negative for dizziness, tremors, seizures, syncope, weakness, light-headedness, numbness and headaches.   Hematological:  Negative for adenopathy. Does not bruise/bleed easily.   Psychiatric/Behavioral:  Negative for agitation and confusion. The patient is not nervous/anxious.    All other systems reviewed and are negative.    Objective   Vitals:    06/24/25 1312   BP: 130/80   BP Location: Left arm   Pulse: 54   Temp: 97.8 °F (36.6 °C)   TempSrc: Temporal   SpO2: 95%   Weight: 127 kg (279 lb)   Height: 175.3 cm (69.02\")     Body mass index is 41.18 kg/m².  Physical Exam  Constitutional:       Appearance: He is well-developed.   HENT:      Head: Normocephalic and atraumatic.   Eyes:      Pupils: Pupils are equal, round, and reactive to light.   Neck:      Trachea: No tracheal deviation.   Cardiovascular:      Rate and Rhythm: Normal rate and regular rhythm.      Heart sounds: Normal heart sounds. No murmur heard.     No friction rub. No gallop.   Pulmonary:      Effort: Pulmonary effort is normal. No respiratory distress.      Breath sounds: Normal " breath sounds. No wheezing or rales.   Chest:      Chest wall: No tenderness.   Abdominal:      General: Bowel sounds are normal. There is no distension.      Palpations: Abdomen is soft. Abdomen is not rigid.      Tenderness: There is no abdominal tenderness. There is no guarding or rebound.   Musculoskeletal:         General: No tenderness or deformity. Normal range of motion.      Cervical back: Normal range of motion and neck supple.   Skin:     General: Skin is warm and dry.      Coloration: Skin is not pale.      Findings: No rash.   Neurological:      Mental Status: He is alert and oriented to person, place, and time.      Deep Tendon Reflexes: Reflexes are normal and symmetric.   Psychiatric:         Behavior: Behavior normal.         Thought Content: Thought content normal.         Judgment: Judgment normal.       Assessment & Plan   Diagnoses and all orders for this visit:    1. BRBPR (bright red blood per rectum) (Primary)  -     Case Request; Standing  -     Case Request    2. Slow transit constipation    3. Change in bowel habits    4. Adenomatous polyp of colon, unspecified part of colon    5. Nonsmoker    6. HTN (hypertension), benign  Comments:  cont BP medication the day of procedure    7. Anticoagulated  Comments:  Xarelto to hold per DR Olamide tovar for AFib;last note says it is ok to hold prior to procedure    Other orders  -     Implement Anesthesia Orders Day of Procedure; Standing  -     Follow Anesthesia Guidelines / Protocol; Future  -     Verify bowel prep was successful; Standing  -     Obtain Informed Consent; Standing  -     sodium-potassium-magnesium sulfates (Suprep Bowel Prep Kit) 17.5-3.13-1.6 GM/177ML solution oral solution; Take as directed by office instructions provided  Dispense: 177 mL; Refill: 0           1. Rectal bleeding:  - Dr. Holloway for a colonoscopy.  - Clear liquid diet the day before the procedure, avoiding red-colored liquids.  - Arrange for transportation  post-procedure.  -manage constipation    2. Constipation:  - KUB on 05/27/2025 showed mild retained colonic stool, no mass or calcifications.  - Continue adequate hydration to maintain regular bowel movements.  -Miralax daily up to 2 times per day 17 grams each dose.    COLONOSCOPY WITH ANESTHESIA (N/A)  Part of this note may be an electronic transcription/translation of spoken language to printed text using the Dragon Dictation System.  Body mass index is 41.18 kg/m².  Return if symptoms worsen or fail to improve.    Class 3 Severe Obesity (BMI >=40). Obesity-related health conditions include the following: hypertension. Obesity is unchanged. BMI is is above average; BMI management plan is completed. We discussed portion control and increasing exercise.      All risks, benefits, alternatives, and indications of colonoscopy and/or Endoscopy procedure have been discussed with the patient. Risks to include perforation of the colon requiring possible surgery or colostomy, risk of bleeding from biopsies or removal of colon tissue, possibility of missing a colon polyp or cancer, or adverse drug reaction.  Benefits to include the diagnosis and management of disease of the colon and rectum. Alternatives to include barium enema, radiographic evaluation, lab testing or no intervention. Pt verbalizes understanding and agrees.     Patient or patient representative verbalized consent for the use of Ambient Listening during the visit with  JAMAAL Park for chart documentation. 6/24/2025  13:33 CDT    JAMAAL Park  6/24/2025  13:42 CDT          If you smoke or use tobacco, 4 minutes reading provided  Steps to Quit Smoking  Smoking tobacco can be harmful to your health and can affect almost every organ in your body. Smoking puts you, and those around you, at risk for developing many serious chronic diseases. Quitting smoking is difficult, but it is one of the best things that you can do for your health.  It is never too late to quit.  What are the benefits of quitting smoking?  When you quit smoking, you lower your risk of developing serious diseases and conditions, such as:  Lung cancer or lung disease, such as COPD.  Heart disease.  Stroke.  Heart attack.  Infertility.  Osteoporosis and bone fractures.  Additionally, symptoms such as coughing, wheezing, and shortness of breath may get better when you quit. You may also find that you get sick less often because your body is stronger at fighting off colds and infections. If you are pregnant, quitting smoking can help to reduce your chances of having a baby of low birth weight.  How do I get ready to quit?  When you decide to quit smoking, create a plan to make sure that you are successful. Before you quit:  Pick a date to quit. Set a date within the next two weeks to give you time to prepare.  Write down the reasons why you are quitting. Keep this list in places where you will see it often, such as on your bathroom mirror or in your car or wallet.  Identify the people, places, things, and activities that make you want to smoke (triggers) and avoid them. Make sure to take these actions:  Throw away all cigarettes at home, at work, and in your car.  Throw away smoking accessories, such as ashtrays and lighters.  Clean your car and make sure to empty the ashtray.  Clean your home, including curtains and carpets.  Tell your family, friends, and coworkers that you are quitting. Support from your loved ones can make quitting easier.  Talk with your health care provider about your options for quitting smoking.  Find out what treatment options are covered by your health insurance.  What strategies can I use to quit smoking?  Talk with your healthcare provider about different strategies to quit smoking. Some strategies include:  Quitting smoking altogether instead of gradually lessening how much you smoke over a period of time. Research shows that quitting “cold turkey” is  more successful than gradually quitting.  Attending in-person counseling to help you build problem-solving skills. You are more likely to have success in quitting if you attend several counseling sessions. Even short sessions of 10 minutes can be effective.  Finding resources and support systems that can help you to quit smoking and remain smoke-free after you quit. These resources are most helpful when you use them often. They can include:  Online chats with a counselor.  Telephone quitlines.  Printed self-help materials.  Support groups or group counseling.  Text messaging programs.  Mobile phone applications.  Taking medicines to help you quit smoking. (If you are pregnant or breastfeeding, talk with your health care provider first.) Some medicines contain nicotine and some do not. Both types of medicines help with cravings, but the medicines that include nicotine help to relieve withdrawal symptoms. Your health care provider may recommend:  Nicotine patches, gum, or lozenges.  Nicotine inhalers or sprays.  Non-nicotine medicine that is taken by mouth.  Talk with your health care provider about combining strategies, such as taking medicines while you are also receiving in-person counseling. Using these two strategies together makes you more likely to succeed in quitting than if you used either strategy on its own.  If you are pregnant or breastfeeding, talk with your health care provider about finding counseling or other support strategies to quit smoking. Do not take medicine to help you quit smoking unless told to do so by your health care provider.  What things can I do to make it easier to quit?  Quitting smoking might feel overwhelming at first, but there is a lot that you can do to make it easier. Take these important actions:  Reach out to your family and friends and ask that they support and encourage you during this time. Call telephone quitlines, reach out to support groups, or work with a counselor for  support.  Ask people who smoke to avoid smoking around you.  Avoid places that trigger you to smoke, such as bars, parties, or smoke-break areas at work.  Spend time around people who do not smoke.  Lessen stress in your life, because stress can be a smoking trigger for some people. To lessen stress, try:  Exercising regularly.  Deep-breathing exercises.  Yoga.  Meditating.  Performing a body scan. This involves closing your eyes, scanning your body from head to toe, and noticing which parts of your body are particularly tense. Purposefully relax the muscles in those areas.  Download or purchase mobile phone or tablet apps (applications) that can help you stick to your quit plan by providing reminders, tips, and encouragement. There are many free apps, such as QuitGuide from the CDC (Centers for Disease Control and Prevention). You can find other support for quitting smoking (smoking cessation) through smokefree.gov and other websites.  How will I feel when I quit smoking?  Within the first 24 hours of quitting smoking, you may start to feel some withdrawal symptoms. These symptoms are usually most noticeable 2-3 days after quitting, but they usually do not last beyond 2-3 weeks. Changes or symptoms that you might experience include:  Mood swings.  Restlessness, anxiety, or irritation.  Difficulty concentrating.  Dizziness.  Strong cravings for sugary foods in addition to nicotine.  Mild weight gain.  Constipation.  Nausea.  Coughing or a sore throat.  Changes in how your medicines work in your body.  A depressed mood.  Difficulty sleeping (insomnia).  After the first 2-3 weeks of quitting, you may start to notice more positive results, such as:  Improved sense of smell and taste.  Decreased coughing and sore throat.  Slower heart rate.  Lower blood pressure.  Clearer skin.  The ability to breathe more easily.  Fewer sick days.  Quitting smoking is very challenging for most people. Do not get discouraged if you are  not successful the first time. Some people need to make many attempts to quit before they achieve long-term success. Do your best to stick to your quit plan, and talk with your health care provider if you have any questions or concerns.  This information is not intended to replace advice given to you by your health care provider. Make sure you discuss any questions you have with your health care provider.  Document Released: 12/12/2002 Document Revised: 08/15/2017 Document Reviewed: 05/03/2016  [employer] Interactive Patient Education © 2017 [employer]

## 2025-06-24 NOTE — H&P (VIEW-ONLY)
Devin Cadena  1955    6/24/2025  Chief Complaint   Patient presents with    GI Problem     Rectal bleeding, constipation,colon recall-hx of polyps     Subjective   HPI  Devinbrittany Cadena      The patient is a 70-year-old male who presents for evaluation of rectal bleeding and constipation.    He reports experiencing minor rectal bleeding, described as small spots, which he first noticed approximately a month ago during a period of constipation. The bleeding was observed on the tissue paper post-defecation. He has not experienced any further bleeding since the initial episode. He does not report any associated symptoms such as fever, chills, sweats, or weight loss. No abdominal pain.     He sought medical attention at [facility] where he underwent an enema and x-ray examination. The results were normal, indicating only constipation. His bowel movements are now irregular, occurring every three days. He is not currently using MiraLAX or any other laxatives. He maintains a high water intake.    PAST SURGICAL HISTORY:  Colonoscopy with polyp removal and hemorrhoids noted on 03/02/2020 with Dr Bro.    FAMILY HISTORY  - Negative for colon cancer     Results  Imaging   - KUB: 05/27/2025, Mild amount of retained colonic stool, no evidence for mass, no calcifications.     Past Medical History:   Diagnosis Date    Arthritis     Diabetes mellitus     Hx of colonic polyp     Hyperlipidemia     Hypertension     Sleep apnea     Stroke     TIA (transient ischemic attack)      Past Surgical History:   Procedure Laterality Date    COLONOSCOPY  03/02/2020    Dr. bro-small hyperplastic  polyp at 20 cm, tubular adenoma polyp at 40 cm, diverticulosis in the left colon, hemorrhoids    COLONOSCOPY W/ POLYPECTOMY  11/11/2011    2 early tubular adenomas repeat exam in 5 years    COLONOSCOPY W/ POLYPECTOMY  12/19/2016    4 Tubular adenomas cecum and at 50 cm repeat exam in 3 years    HYDROCELECTOMY Bilateral 03/25/2024     Procedure: HYDROCELECTOMY;  Surgeon: Daniel Acosta MD;  Location: [facility];  Service: Urology;  Laterality: Bilateral;    URETERAL STENT INSERTION         Outpatient Medications Marked as Taking for the 6/24/25 encounter (Office Visit) with Corazon Lee APRN   Medication Sig Dispense Refill    allopurinol (ZYLOPRIM) 100 MG tablet Daily.      B Complex Vitamins (VITAMIN B COMPLEX PO) Take 1 tablet by mouth Daily.      docusate sodium (Colace) 100 MG capsule Take 1 capsule by mouth 2 (Two) Times a Day. 60 capsule 1    furosemide (LASIX) 20 MG tablet Take 1 tablet by mouth Daily.      losartan (COZAAR) 25 MG tablet Take 4 tablets by mouth Daily.      metoprolol succinate XL (TOPROL-XL) 100 MG 24 hr tablet Daily.      MILK THISTLE PO Take 7,500 mg by mouth Daily.      NON FORMULARY Omegas turmeric      rivaroxaban (XARELTO) 20 MG tablet Take 1 tablet by mouth Daily.      Specialty Vitamins Products (JOINT/BONE VITALITY PO) Take  by mouth.      Specialty Vitamins Products (LONGEVITY PO) Take  by mouth.      vitamin D3 (vitamin d) 125 MCG (5000 UT) capsule capsule Take 1 capsule by mouth Daily.       No Known Allergies  Social History     Socioeconomic History    Marital status:    Tobacco Use    Smoking status: Never    Smokeless tobacco: Never   Vaping Use    Vaping status: Never Used   Substance and Sexual Activity    Alcohol use: No    Drug use: Never    Sexual activity: Defer     Family History   Problem Relation Age of Onset    Colon cancer Neg Hx     Colon polyps Neg Hx      Health Maintenance   Topic Date Due    Pneumococcal Vaccine 50+ (1 of 1 - PCV) Never done    ZOSTER VACCINE (1 of 2) Never done    ANNUAL WELLNESS VISIT  Never done    TDAP/TD VACCINES (5 - Td or Tdap) 12/17/2023    COVID-19 Vaccine (4 - 2024-25 season) 09/01/2024    COLORECTAL CANCER SCREENING  03/02/2025    INFLUENZA VACCINE  07/01/2025    HEPATITIS C SCREENING  Completed     Review of Systems   Constitutional:  Negative  "for activity change, appetite change, chills, diaphoresis, fatigue, fever and unexpected weight change.   HENT:  Negative for ear pain, hearing loss, mouth sores, sore throat, trouble swallowing and voice change.    Eyes: Negative.    Respiratory:  Negative for cough, choking, shortness of breath and wheezing.    Cardiovascular:  Negative for chest pain and palpitations.   Gastrointestinal:  Positive for constipation. Negative for abdominal pain, blood in stool, diarrhea, nausea and vomiting.   Endocrine: Negative for cold intolerance and heat intolerance.   Genitourinary:  Negative for decreased urine volume, dysuria, frequency, hematuria and urgency.   Musculoskeletal:  Negative for back pain, gait problem and myalgias.   Skin:  Negative for color change, pallor and rash.   Allergic/Immunologic: Negative for food allergies and immunocompromised state.   Neurological:  Negative for dizziness, tremors, seizures, syncope, weakness, light-headedness, numbness and headaches.   Hematological:  Negative for adenopathy. Does not bruise/bleed easily.   Psychiatric/Behavioral:  Negative for agitation and confusion. The patient is not nervous/anxious.    All other systems reviewed and are negative.    Objective   Vitals:    06/24/25 1312   BP: 130/80   BP Location: Left arm   Pulse: 54   Temp: 97.8 °F (36.6 °C)   TempSrc: Temporal   SpO2: 95%   Weight: 127 kg (279 lb)   Height: 175.3 cm (69.02\")     Body mass index is 41.18 kg/m².  Physical Exam  Constitutional:       Appearance: He is well-developed.   HENT:      Head: Normocephalic and atraumatic.   Eyes:      Pupils: Pupils are equal, round, and reactive to light.   Neck:      Trachea: No tracheal deviation.   Cardiovascular:      Rate and Rhythm: Normal rate and regular rhythm.      Heart sounds: Normal heart sounds. No murmur heard.     No friction rub. No gallop.   Pulmonary:      Effort: Pulmonary effort is normal. No respiratory distress.      Breath sounds: Normal " breath sounds. No wheezing or rales.   Chest:      Chest wall: No tenderness.   Abdominal:      General: Bowel sounds are normal. There is no distension.      Palpations: Abdomen is soft. Abdomen is not rigid.      Tenderness: There is no abdominal tenderness. There is no guarding or rebound.   Musculoskeletal:         General: No tenderness or deformity. Normal range of motion.      Cervical back: Normal range of motion and neck supple.   Skin:     General: Skin is warm and dry.      Coloration: Skin is not pale.      Findings: No rash.   Neurological:      Mental Status: He is alert and oriented to person, place, and time.      Deep Tendon Reflexes: Reflexes are normal and symmetric.   Psychiatric:         Behavior: Behavior normal.         Thought Content: Thought content normal.         Judgment: Judgment normal.       Assessment & Plan   Diagnoses and all orders for this visit:    1. BRBPR (bright red blood per rectum) (Primary)  -     Case Request; Standing  -     Case Request    2. Slow transit constipation    3. Change in bowel habits    4. Adenomatous polyp of colon, unspecified part of colon    5. Nonsmoker    6. HTN (hypertension), benign  Comments:  cont BP medication the day of procedure    7. Anticoagulated  Comments:  Xarelto to hold per DR Olamide tovar for AFib;last note says it is ok to hold prior to procedure    Other orders  -     Implement Anesthesia Orders Day of Procedure; Standing  -     Follow Anesthesia Guidelines / Protocol; Future  -     Verify bowel prep was successful; Standing  -     Obtain Informed Consent; Standing  -     sodium-potassium-magnesium sulfates (Suprep Bowel Prep Kit) 17.5-3.13-1.6 GM/177ML solution oral solution; Take as directed by office instructions provided  Dispense: 177 mL; Refill: 0           1. Rectal bleeding:  - Dr. Holloway for a colonoscopy.  - Clear liquid diet the day before the procedure, avoiding red-colored liquids.  - Arrange for transportation  post-procedure.  -manage constipation    2. Constipation:  - KUB on 05/27/2025 showed mild retained colonic stool, no mass or calcifications.  - Continue adequate hydration to maintain regular bowel movements.  -Miralax daily up to 2 times per day 17 grams each dose.    COLONOSCOPY WITH ANESTHESIA (N/A)  Part of this note may be an electronic transcription/translation of spoken language to printed text using the Dragon Dictation System.  Body mass index is 41.18 kg/m².  Return if symptoms worsen or fail to improve.    Class 3 Severe Obesity (BMI >=40). Obesity-related health conditions include the following: hypertension. Obesity is unchanged. BMI is is above average; BMI management plan is completed. We discussed portion control and increasing exercise.      All risks, benefits, alternatives, and indications of colonoscopy and/or Endoscopy procedure have been discussed with the patient. Risks to include perforation of the colon requiring possible surgery or colostomy, risk of bleeding from biopsies or removal of colon tissue, possibility of missing a colon polyp or cancer, or adverse drug reaction.  Benefits to include the diagnosis and management of disease of the colon and rectum. Alternatives to include barium enema, radiographic evaluation, lab testing or no intervention. Pt verbalizes understanding and agrees.     Patient or patient representative verbalized consent for the use of Ambient Listening during the visit with  JAMAAL Park for chart documentation. 6/24/2025  13:33 CDT    JAMAAL Park  6/24/2025  13:42 CDT          If you smoke or use tobacco, 4 minutes reading provided  Steps to Quit Smoking  Smoking tobacco can be harmful to your health and can affect almost every organ in your body. Smoking puts you, and those around you, at risk for developing many serious chronic diseases. Quitting smoking is difficult, but it is one of the best things that you can do for your health.  It is never too late to quit.  What are the benefits of quitting smoking?  When you quit smoking, you lower your risk of developing serious diseases and conditions, such as:  Lung cancer or lung disease, such as COPD.  Heart disease.  Stroke.  Heart attack.  Infertility.  Osteoporosis and bone fractures.  Additionally, symptoms such as coughing, wheezing, and shortness of breath may get better when you quit. You may also find that you get sick less often because your body is stronger at fighting off colds and infections. If you are pregnant, quitting smoking can help to reduce your chances of having a baby of low birth weight.  How do I get ready to quit?  When you decide to quit smoking, create a plan to make sure that you are successful. Before you quit:  Pick a date to quit. Set a date within the next two weeks to give you time to prepare.  Write down the reasons why you are quitting. Keep this list in places where you will see it often, such as on your bathroom mirror or in your car or wallet.  Identify the people, places, things, and activities that make you want to smoke (triggers) and avoid them. Make sure to take these actions:  Throw away all cigarettes at home, at work, and in your car.  Throw away smoking accessories, such as ashtrays and lighters.  Clean your car and make sure to empty the ashtray.  Clean your home, including curtains and carpets.  Tell your family, friends, and coworkers that you are quitting. Support from your loved ones can make quitting easier.  Talk with your health care provider about your options for quitting smoking.  Find out what treatment options are covered by your health insurance.  What strategies can I use to quit smoking?  Talk with your healthcare provider about different strategies to quit smoking. Some strategies include:  Quitting smoking altogether instead of gradually lessening how much you smoke over a period of time. Research shows that quitting “cold turkey” is  more successful than gradually quitting.  Attending in-person counseling to help you build problem-solving skills. You are more likely to have success in quitting if you attend several counseling sessions. Even short sessions of 10 minutes can be effective.  Finding resources and support systems that can help you to quit smoking and remain smoke-free after you quit. These resources are most helpful when you use them often. They can include:  Online chats with a counselor.  Telephone quitlines.  Printed self-help materials.  Support groups or group counseling.  Text messaging programs.  Mobile phone applications.  Taking medicines to help you quit smoking. (If you are pregnant or breastfeeding, talk with your health care provider first.) Some medicines contain nicotine and some do not. Both types of medicines help with cravings, but the medicines that include nicotine help to relieve withdrawal symptoms. Your health care provider may recommend:  Nicotine patches, gum, or lozenges.  Nicotine inhalers or sprays.  Non-nicotine medicine that is taken by mouth.  Talk with your health care provider about combining strategies, such as taking medicines while you are also receiving in-person counseling. Using these two strategies together makes you more likely to succeed in quitting than if you used either strategy on its own.  If you are pregnant or breastfeeding, talk with your health care provider about finding counseling or other support strategies to quit smoking. Do not take medicine to help you quit smoking unless told to do so by your health care provider.  What things can I do to make it easier to quit?  Quitting smoking might feel overwhelming at first, but there is a lot that you can do to make it easier. Take these important actions:  Reach out to your family and friends and ask that they support and encourage you during this time. Call telephone quitlines, reach out to support groups, or work with a counselor for  support.  Ask people who smoke to avoid smoking around you.  Avoid places that trigger you to smoke, such as bars, parties, or smoke-break areas at work.  Spend time around people who do not smoke.  Lessen stress in your life, because stress can be a smoking trigger for some people. To lessen stress, try:  Exercising regularly.  Deep-breathing exercises.  Yoga.  Meditating.  Performing a body scan. This involves closing your eyes, scanning your body from head to toe, and noticing which parts of your body are particularly tense. Purposefully relax the muscles in those areas.  Download or purchase mobile phone or tablet apps (applications) that can help you stick to your quit plan by providing reminders, tips, and encouragement. There are many free apps, such as QuitGuide from the CDC (Centers for Disease Control and Prevention). You can find other support for quitting smoking (smoking cessation) through smokefree.gov and other websites.  How will I feel when I quit smoking?  Within the first 24 hours of quitting smoking, you may start to feel some withdrawal symptoms. These symptoms are usually most noticeable 2-3 days after quitting, but they usually do not last beyond 2-3 weeks. Changes or symptoms that you might experience include:  Mood swings.  Restlessness, anxiety, or irritation.  Difficulty concentrating.  Dizziness.  Strong cravings for sugary foods in addition to nicotine.  Mild weight gain.  Constipation.  Nausea.  Coughing or a sore throat.  Changes in how your medicines work in your body.  A depressed mood.  Difficulty sleeping (insomnia).  After the first 2-3 weeks of quitting, you may start to notice more positive results, such as:  Improved sense of smell and taste.  Decreased coughing and sore throat.  Slower heart rate.  Lower blood pressure.  Clearer skin.  The ability to breathe more easily.  Fewer sick days.  Quitting smoking is very challenging for most people. Do not get discouraged if you are  not successful the first time. Some people need to make many attempts to quit before they achieve long-term success. Do your best to stick to your quit plan, and talk with your health care provider if you have any questions or concerns.  This information is not intended to replace advice given to you by your health care provider. Make sure you discuss any questions you have with your health care provider.  Document Released: 12/12/2002 Document Revised: 08/15/2017 Document Reviewed: 05/03/2016  Elsevier Interactive Patient Education © 2017 [employer]

## 2025-07-15 ENCOUNTER — TELEPHONE (OUTPATIENT)
Dept: GASTROENTEROLOGY | Facility: CLINIC | Age: 70
End: 2025-07-15
Payer: MEDICARE

## 2025-07-15 NOTE — TELEPHONE ENCOUNTER
Provider: DR JEAN SHUKLA     Caller: ASHISH TRUJILLO     Relationship to Patient: SELF    Phone Number:[phone]     Reason for Call: PT STARTS PREP TONIGHT AT 6 PM AND THEN START  AM AND PT WANTS TO KNOW IF HE CAN START THE EVENING AND MORNING  PREP SOONER DUE TO HAVING TO DRIVE 45 MILES AND HE DOESN'T KNOW IF HE CAN MAKE THE DRIVE PLEASE ADVISE AND CALL PT BACK IF YOU CAN'T REACH HIM CALL HIS WIFE [phone]

## 2025-07-16 ENCOUNTER — TELEPHONE (OUTPATIENT)
Dept: GASTROENTEROLOGY | Facility: CLINIC | Age: 70
End: 2025-07-16
Payer: MEDICARE

## 2025-07-16 ENCOUNTER — ANESTHESIA (OUTPATIENT)
Dept: GASTROENTEROLOGY | Facility: HOSPITAL | Age: 70
End: 2025-07-16
Payer: MEDICARE

## 2025-07-16 ENCOUNTER — HOSPITAL ENCOUNTER (OUTPATIENT)
Facility: HOSPITAL | Age: 70
Setting detail: HOSPITAL OUTPATIENT SURGERY
Discharge: HOME | End: 2025-07-16
Attending: INTERNAL MEDICINE | Admitting: INTERNAL MEDICINE
Payer: MEDICARE

## 2025-07-16 ENCOUNTER — ANESTHESIA EVENT (OUTPATIENT)
Dept: GASTROENTEROLOGY | Facility: HOSPITAL | Age: 70
End: 2025-07-16
Payer: MEDICARE

## 2025-07-16 VITALS
DIASTOLIC BLOOD PRESSURE: 88 MMHG | SYSTOLIC BLOOD PRESSURE: 174 MMHG | HEIGHT: 69 IN | BODY MASS INDEX: 41.18 KG/M2 | HEART RATE: 80 BPM | RESPIRATION RATE: 22 BRPM | WEIGHT: 278 LBS | OXYGEN SATURATION: 93 % | TEMPERATURE: 97.5 F

## 2025-07-16 PROCEDURE — 25810000003 SODIUM CHLORIDE 0.9 % SOLUTION: Performed by: ANESTHESIOLOGY

## 2025-07-16 PROCEDURE — 25010000002 PROPOFOL 10 MG/ML EMULSION: Performed by: NURSE ANESTHETIST, CERTIFIED REGISTERED

## 2025-07-16 PROCEDURE — G0105 COLORECTAL SCRN; HI RISK IND: HCPCS | Performed by: INTERNAL MEDICINE

## 2025-07-16 RX ORDER — SODIUM CHLORIDE 0.9 % (FLUSH) 0.9 %
10 SYRINGE (ML) INJECTION AS NEEDED
Status: DISCONTINUED | OUTPATIENT
Start: 2025-07-16 | End: 2025-07-16 | Stop reason: HOSPADM

## 2025-07-16 RX ORDER — LIDOCAINE HYDROCHLORIDE 10 MG/ML
0.5 INJECTION, SOLUTION EPIDURAL; INFILTRATION; INTRACAUDAL; PERINEURAL ONCE AS NEEDED
Status: DISCONTINUED | OUTPATIENT
Start: 2025-07-16 | End: 2025-07-16 | Stop reason: HOSPADM

## 2025-07-16 RX ORDER — ONDANSETRON 2 MG/ML
4 INJECTION INTRAMUSCULAR; INTRAVENOUS ONCE AS NEEDED
Status: DISCONTINUED | OUTPATIENT
Start: 2025-07-16 | End: 2025-07-16 | Stop reason: HOSPADM

## 2025-07-16 RX ORDER — SODIUM CHLORIDE 9 MG/ML
500 INJECTION, SOLUTION INTRAVENOUS CONTINUOUS PRN
Status: DISCONTINUED | OUTPATIENT
Start: 2025-07-16 | End: 2025-07-16 | Stop reason: HOSPADM

## 2025-07-16 RX ORDER — PROPOFOL 10 MG/ML
VIAL (ML) INTRAVENOUS AS NEEDED
Status: DISCONTINUED | OUTPATIENT
Start: 2025-07-16 | End: 2025-07-16 | Stop reason: SURG

## 2025-07-16 RX ADMIN — PROPOFOL 50 MG: 10 INJECTION, EMULSION INTRAVENOUS at 10:35

## 2025-07-16 RX ADMIN — SODIUM CHLORIDE 500 ML: 9 INJECTION, SOLUTION INTRAVENOUS at 10:12

## 2025-07-16 RX ADMIN — PROPOFOL 100 MG: 10 INJECTION, EMULSION INTRAVENOUS at 10:20

## 2025-07-16 RX ADMIN — PROPOFOL 50 MG: 10 INJECTION, EMULSION INTRAVENOUS at 10:29

## 2025-07-16 NOTE — ANESTHESIA PREPROCEDURE EVALUATION
Anesthesia Evaluation     Patient summary reviewed   no history of anesthetic complications:   NPO Solid Status: > 8 hours  NPO Liquid Status: > 8 hours           Airway   Mallampati: III  TM distance: >3 FB  Dental - normal exam     Pulmonary    (+) ,sleep apnea on CPAP  (-) asthma, not a smoker  Cardiovascular   Exercise tolerance: good (4-7 METS) (Limited by mobility, uses walker)    ECG reviewed    (+) hypertension, hyperlipidemia (no longer taking statins or meds)  (-) past MI, dysrhythmias, cardiac stents      Neuro/Psych  (+) TIA  (-) seizures  GI/Hepatic/Renal/Endo    (+) morbid obesity, GI bleeding , renal disease-, diabetes mellitus  (-) liver disease    Musculoskeletal     Abdominal    Substance History      OB/GYN          Other                      Anesthesia Plan    ASA 3     MAC     intravenous induction     Anesthetic plan, risks, benefits, and alternatives have been provided, discussed and informed consent has been obtained with: patient.    CODE STATUS:

## 2025-07-16 NOTE — ANESTHESIA POSTPROCEDURE EVALUATION
Patient: Devin Cadena    Procedure Summary       Date: 07/16/25 Room / Location: [facility] ENDOSCOPY 4 / [facility] ENDOS[facility]    Anesthesia Start: 1018 Anesthesia Stop: 1040    Procedure: COLONOSCOPY WITH ANESTHESIA Diagnosis:       BRBPR (bright red blood per rectum)      (BRBPR (bright red blood per rectum) [K62.5])    Surgeons: Harvey Holloway MD Provider: Jamey Beltran CRNA    Anesthesia Type: MAC ASA Status: 3            Anesthesia Type: MAC    Vitals  Vitals Value Taken Time   BP     Temp     Pulse 81 07/16/25 10:40   Resp     SpO2 96 % 07/16/25 10:40   Vitals shown include unfiled device data.        Post Anesthesia Care and Evaluation    Patient location during evaluation: PHASE II  Patient participation: complete - patient participated  Level of consciousness: awake and alert  Pain score: 0  Pain management: adequate    Airway patency: patent  Anesthetic complications: No anesthetic complications  PONV Status: none  Cardiovascular status: acceptable and stable  Respiratory status: acceptable  Hydration status: acceptable

## (undated) DEVICE — SYR LUERLOK 30CC

## (undated) DEVICE — VAGINAL PREP TRAY: Brand: MEDLINE INDUSTRIES, INC.

## (undated) DEVICE — GAUZE,SPONGE,FLUFF,6"X6.75",STRL,10/TRAY: Brand: MEDLINE

## (undated) DEVICE — GLV SURG BIOGEL M LTX PF 7 1/2

## (undated) DEVICE — ANTIBACTERIAL UNDYED BRAIDED (POLYGLACTIN 910), SYNTHETIC ABSORBABLE SUTURE: Brand: COATED VICRYL

## (undated) DEVICE — ATHLETIC SUPPORTER LATEX FREE, LARGE

## (undated) DEVICE — 1/4 IN. X 12 IN. LENGTH: Brand: SILICONE TUBING, PENROSE DRAIN

## (undated) DEVICE — PAD MINOR UNIVERSAL: Brand: MEDLINE INDUSTRIES, INC.

## (undated) DEVICE — SUT GUT CHRM 3/0 SH 27IN G122H

## (undated) DEVICE — MASK,OXYGEN,MED CONC,ADLT,7' TUB, UC: Brand: PENDING

## (undated) DEVICE — PAD,NON-ADHERENT,3X8,STERILE,LF,1/PK: Brand: MEDLINE

## (undated) DEVICE — PENROSE DRAIN 18 X .5" SILICONE: Brand: MEDLINE

## (undated) DEVICE — ARGYLE YANKAUER BULB TIP WITH VENT: Brand: ARGYLE

## (undated) DEVICE — SENSR O2 OXIMAX FNGR A/ 18IN NONSTR

## (undated) DEVICE — DEFENDO AIR WATER SUCTION AND BIOPSY VALVE KIT FOR  OLYMPUS: Brand: DEFENDO AIR/WATER/SUCTION AND BIOPSY VALVE

## (undated) DEVICE — CUFF,BP,DISP,1 TUBE,ADULT,HP: Brand: MEDLINE

## (undated) DEVICE — SUT GUT CHRM 2/0 SH 27IN G123H

## (undated) DEVICE — SURGICAL SUCTION CONNECTING TUBE WITH MALE CONNECTOR AND SUCTION CLAMP, 2 FT. LONG (.6 M), 5 MM I.D.: Brand: CONMED

## (undated) DEVICE — GLV SURG DERMASSURE GRN LF PF 8.0

## (undated) DEVICE — THE CHANNEL CLEANING BRUSH IS A NYLON FLEXI BRUSH ATTACHED TO A FLEXIBLE PLASTIC SHEATH DESIGNED TO SAFELY REMOVE DEBRIS FROM FLEXIBLE ENDOSCOPES.